# Patient Record
Sex: FEMALE | Race: WHITE | NOT HISPANIC OR LATINO | Employment: FULL TIME | URBAN - METROPOLITAN AREA
[De-identification: names, ages, dates, MRNs, and addresses within clinical notes are randomized per-mention and may not be internally consistent; named-entity substitution may affect disease eponyms.]

---

## 2018-02-23 ENCOUNTER — HOSPITAL ENCOUNTER (OUTPATIENT)
Dept: RADIOLOGY | Facility: HOSPITAL | Age: 63
Discharge: HOME | End: 2018-02-23
Payer: COMMERCIAL

## 2018-02-23 ENCOUNTER — OFFICE VISIT (OUTPATIENT)
Dept: FAMILY MEDICINE CLINIC | Facility: CLINIC | Age: 63
End: 2018-02-23
Payer: COMMERCIAL

## 2018-02-23 VITALS
RESPIRATION RATE: 16 BRPM | HEART RATE: 80 BPM | HEIGHT: 62 IN | SYSTOLIC BLOOD PRESSURE: 132 MMHG | DIASTOLIC BLOOD PRESSURE: 84 MMHG | BODY MASS INDEX: 25.14 KG/M2 | WEIGHT: 136.6 LBS
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
## 2018-02-23 DIAGNOSIS — M54.2 NECK PAIN: ICD-10-CM

## 2018-02-23 DIAGNOSIS — R53.83 OTHER FATIGUE: ICD-10-CM

## 2018-02-23 DIAGNOSIS — Z12.31 ENCOUNTER FOR SCREENING MAMMOGRAM FOR BREAST CANCER: ICD-10-CM

## 2018-02-23 PROCEDURE — 72040 X-RAY EXAM NECK SPINE 2-3 VW: CPT

## 2018-02-23 PROCEDURE — 99203 OFFICE O/P NEW LOW 30 MIN: CPT | Performed by: FAMILY MEDICINE

## 2018-02-23 RX ORDER — ACETAMINOPHEN 500 MG
1000 TABLET ORAL EVERY 6 HOURS PRN
Qty: 30 TABLET | Refills: 0 | COMMUNITY
Start: 2018-02-23

## 2018-02-23 RX ORDER — DIPHENOXYLATE HYDROCHLORIDE AND ATROPINE SULFATE 2.5; .025 MG/1; MG/1
1 TABLET ORAL DAILY
COMMUNITY

## 2018-02-23 RX ORDER — LORATADINE 10 MG/1
10 TABLET ORAL DAILY
COMMUNITY

## 2018-02-23 RX ORDER — METHOCARBAMOL 500 MG/1
500 TABLET, FILM COATED ORAL 3 TIMES DAILY PRN
Qty: 20 TABLET | Refills: 0 | Status: SHIPPED | OUTPATIENT
Start: 2018-02-23 | End: 2018-11-20 | Stop reason: ALTCHOICE
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
## 2018-03-22 ENCOUNTER — TELEPHONE (OUTPATIENT)
Dept: FAMILY MEDICINE CLINIC | Facility: CLINIC | Age: 63
End: 2018-03-22

## 2018-03-22 NOTE — TELEPHONE ENCOUNTER
Pt called in regards to her upcomin OV on 4/2  She states at her last OV you had asked her to bring the  cholesterol bw she had done at work, but pt states it is only a non-fasting LDL   She wants to know if we can just order a lipid panel to have drawn with the rest of her bw?  [phone]

## 2018-03-23 DIAGNOSIS — E78.5 HYPERLIPIDEMIA, UNSPECIFIED HYPERLIPIDEMIA TYPE: Primary | ICD-10-CM

## 2018-03-25 LAB
ALBUMIN SERPL-MCNC: 4.3 G/DL (ref 3.6–5.1)
ALBUMIN/GLOB SERPL: 1.6 (CALC) (ref 1–2.5)
ALP SERPL-CCNC: 71 U/L (ref 33–130)
ALT SERPL-CCNC: 16 U/L (ref 6–29)
APPEARANCE UR: CLEAR
AST SERPL-CCNC: 20 U/L (ref 10–35)
BACTERIA UR CULT: NORMAL
BACTERIA UR QL AUTO: NORMAL /HPF
BASOPHILS # BLD AUTO: 59 CELLS/UL (ref 0–200)
BASOPHILS NFR BLD AUTO: 0.8 %
BILIRUB SERPL-MCNC: 0.4 MG/DL (ref 0.2–1.2)
BILIRUB UR QL STRIP: NEGATIVE
BUN SERPL-MCNC: 14 MG/DL (ref 7–25)
BUN/CREAT SERPL: NORMAL (CALC) (ref 6–22)
CALCIUM SERPL-MCNC: 9.3 MG/DL (ref 8.6–10.4)
CHLORIDE SERPL-SCNC: 104 MMOL/L (ref 98–110)
CHOLEST SERPL-MCNC: 245 MG/DL
CHOLEST/HDLC SERPL: 2.6 (CALC)
CO2 SERPL-SCNC: 28 MMOL/L (ref 20–31)
COLOR UR: YELLOW
CREAT SERPL-MCNC: 0.78 MG/DL (ref 0.5–0.99)
EOSINOPHIL # BLD AUTO: 170 CELLS/UL (ref 15–500)
EOSINOPHIL NFR BLD AUTO: 2.3 %
ERYTHROCYTE [DISTWIDTH] IN BLOOD BY AUTOMATED COUNT: 13.1 % (ref 11–15)
GLOBULIN SER CALC-MCNC: 2.7 G/DL (CALC) (ref 1.9–3.7)
GLUCOSE SERPL-MCNC: 77 MG/DL (ref 65–99)
GLUCOSE UR QL STRIP: NEGATIVE
HCT VFR BLD AUTO: 37.1 % (ref 35–45)
HDLC SERPL-MCNC: 94 MG/DL
HGB BLD-MCNC: 12.4 G/DL (ref 11.7–15.5)
HGB UR QL STRIP: NEGATIVE
HYALINE CASTS #/AREA URNS LPF: NORMAL /LPF
KETONES UR QL STRIP: NEGATIVE
LDLC SERPL CALC-MCNC: 135 MG/DL (CALC)
LEUKOCYTE ESTERASE UR QL STRIP: NEGATIVE
LYMPHOCYTES # BLD AUTO: 1650 CELLS/UL (ref 850–3900)
LYMPHOCYTES NFR BLD AUTO: 22.3 %
MCH RBC QN AUTO: 30.7 PG (ref 27–33)
MCHC RBC AUTO-ENTMCNC: 33.4 G/DL (ref 32–36)
MCV RBC AUTO: 91.8 FL (ref 80–100)
MONOCYTES # BLD AUTO: 777 CELLS/UL (ref 200–950)
MONOCYTES NFR BLD AUTO: 10.5 %
NEUTROPHILS # BLD AUTO: 4743 CELLS/UL (ref 1500–7800)
NEUTROPHILS NFR BLD AUTO: 64.1 %
NITRITE UR QL STRIP: NEGATIVE
NONHDLC SERPL-MCNC: 151 MG/DL (CALC)
PH UR STRIP: 6.5 [PH] (ref 5–8)
PLATELET # BLD AUTO: 354 THOUSAND/UL (ref 140–400)
PMV BLD REES-ECKER: 10.3 FL (ref 7.5–12.5)
POTASSIUM SERPL-SCNC: 4.1 MMOL/L (ref 3.5–5.3)
PROT SERPL-MCNC: 7 G/DL (ref 6.1–8.1)
PROT UR QL STRIP: NEGATIVE
RBC # BLD AUTO: 4.04 MILLION/UL (ref 3.8–5.1)
RBC #/AREA URNS HPF: NORMAL /HPF
SL AMB EGFR AFRICAN AMERICAN: 94 ML/MIN/1.73M2
SL AMB EGFR NON AFRICAN AMERICAN: 81 ML/MIN/1.73M2
SODIUM SERPL-SCNC: 139 MMOL/L (ref 135–146)
SP GR UR STRIP: 1.01 (ref 1–1.03)
SQUAMOUS #/AREA URNS HPF: NORMAL /HPF
TRIGL SERPL-MCNC: 67 MG/DL
TSH SERPL-ACNC: 2.3 MIU/L (ref 0.4–4.5)
WBC # BLD AUTO: 7.4 THOUSAND/UL (ref 3.8–10.8)
WBC #/AREA URNS HPF: NORMAL /HPF

## 2018-03-27 ENCOUNTER — HOSPITAL ENCOUNTER (OUTPATIENT)
Dept: MAMMOGRAPHY | Facility: CLINIC | Age: 63
Discharge: HOME | End: 2018-03-27
Payer: COMMERCIAL

## 2018-03-27 DIAGNOSIS — Z12.31 ENCOUNTER FOR SCREENING MAMMOGRAM FOR BREAST CANCER: ICD-10-CM

## 2018-03-27 PROCEDURE — 77067 SCR MAMMO BI INCL CAD: CPT

## 2018-04-02 ENCOUNTER — ANNUAL EXAM (OUTPATIENT)
Dept: FAMILY MEDICINE CLINIC | Facility: CLINIC | Age: 63
End: 2018-04-02
Payer: COMMERCIAL

## 2018-04-02 VITALS
RESPIRATION RATE: 14 BRPM | HEIGHT: 62 IN | BODY MASS INDEX: 25.14 KG/M2 | DIASTOLIC BLOOD PRESSURE: 76 MMHG | WEIGHT: 136.6 LBS | SYSTOLIC BLOOD PRESSURE: 122 MMHG | HEART RATE: 74 BPM

## 2018-04-02 DIAGNOSIS — Z01.419 ENCOUNTER FOR GYNECOLOGICAL EXAMINATION: Primary | ICD-10-CM

## 2018-04-02 DIAGNOSIS — Z12.11 SCREENING FOR MALIGNANT NEOPLASM OF COLON: ICD-10-CM

## 2018-04-02 DIAGNOSIS — H10.13 ALLERGIC CONJUNCTIVITIS OF BOTH EYES: ICD-10-CM

## 2018-04-02 DIAGNOSIS — R92.8 ABNORMAL FINDING ON MAMMOGRAPHY: ICD-10-CM

## 2018-04-02 PROCEDURE — 99396 PREV VISIT EST AGE 40-64: CPT | Performed by: FAMILY MEDICINE

## 2018-04-02 RX ORDER — OLOPATADINE HYDROCHLORIDE 1 MG/ML
1 SOLUTION/ DROPS OPHTHALMIC 2 TIMES DAILY
Qty: 5 ML | Refills: 0 | Status: SHIPPED | OUTPATIENT
Start: 2018-04-02 | End: 2018-11-20 | Stop reason: ALTCHOICE

## 2018-04-02 RX ORDER — FLUTICASONE PROPIONATE 50 MCG
1 SPRAY, SUSPENSION (ML) NASAL DAILY
COMMUNITY
End: 2018-11-20 | Stop reason: ALTCHOICE

## 2018-04-03 LAB
CLINICAL INFO: NORMAL
DATE PREVIOUS BX: NORMAL
LMP START DATE: NORMAL
SL AMB PREV. PAP:: NORMAL
SPECIMEN SOURCE CVX/VAG CYTO: NORMAL

## 2018-04-03 NOTE — PROGRESS NOTES
Assessment/Plan:  1  Encounter for gynecological examination  - Thinprep Pap Rfx HR HPV    2  Screening for malignant neoplasm of colon  - Ambulatory referral to Gastroenterology    3  Allergic conjunctivitis of both eyes  - olopatadine (PATANOL) 0 1 % ophthalmic solution; Administer 1 drop to both eyes 2 (two) times a day  Dispense: 5 mL; Refill: 0    4  Abnormal finding on mammography  - left breast US ordered         Possible side effects of new medications were reviewed with the patient today  The treatment plan was reviewed with the patient  The patient understands and agrees with the treatment plan      Subjective:   Chief Complaint   Patient presents with    Gynecologic Exam      Patient ID: Brennan Pimentel is a 58 y o  female presents today for annual Gyn exam, she is c/o itchy watery eyes for the past 2 weeks, no eye pain or d/c, no eye redness, no photophobia, no fever or chills, no URI symptoms  Patient offers no other complaints  Gynecologic Exam   The patient's pertinent negatives include no genital itching, genital lesions, genital rash, pelvic pain, vaginal bleeding or vaginal discharge  Pertinent negatives include no abdominal pain, chills, constipation, diarrhea, dysuria, fever, flank pain, frequency, headaches, hematuria, nausea, painful intercourse, rash, sore throat, urgency or vomiting  She is postmenopausal        History reviewed  No pertinent past medical history  Past Surgical History:   Procedure Laterality Date     SECTION      two    DENTAL SURGERY       Family History   Problem Relation Age of Onset    Hypertension Mother     Diabetes Mother     Prostate cancer Father     Substance Abuse Brother     Pancreatic cancer Paternal Grandmother     Mental illness Neg Hx      Social History     Social History    Marital status: /Civil Union     Spouse name: N/A    Number of children: N/A    Years of education: N/A     Occupational History    Not on file  Social History Main Topics    Smoking status: Former Smoker    Smokeless tobacco: Never Used    Alcohol use Yes    Drug use: No    Sexual activity: Not on file     Other Topics Concern    Not on file     Social History Narrative    No narrative on file       Current Outpatient Prescriptions:     acetaminophen (TYLENOL) 500 mg tablet, Take 2 tablets (1,000 mg total) by mouth every 6 (six) hours as needed for mild pain, Disp: 30 tablet, Rfl: 0    fluticasone (FLONASE) 50 mcg/act nasal spray, 1 spray into each nostril daily, Disp: , Rfl:     loratadine (CLARITIN) 10 mg tablet, Take 10 mg by mouth daily, Disp: , Rfl:     methocarbamol (ROBAXIN) 500 mg tablet, Take 1 tablet (500 mg total) by mouth 3 (three) times a day as needed for muscle spasms, Disp: 20 tablet, Rfl: 0    multivitamin (THERAGRAN) TABS, Take 1 tablet by mouth daily, Disp: , Rfl:     olopatadine (PATANOL) 0 1 % ophthalmic solution, Administer 1 drop to both eyes 2 (two) times a day, Disp: 5 mL, Rfl: 0    Review of Systems   Constitutional: Negative for appetite change, chills, fatigue and fever  HENT: Negative for congestion, ear pain, postnasal drip, rhinorrhea and sore throat  Eyes: Positive for itching  Negative for photophobia, pain, discharge, redness and visual disturbance  Respiratory: Negative for cough, shortness of breath and wheezing  Cardiovascular: Negative for chest pain, palpitations and leg swelling  Gastrointestinal: Negative for abdominal pain, blood in stool, constipation, diarrhea, nausea and vomiting  Endocrine: Negative for cold intolerance, heat intolerance, polydipsia, polyphagia and polyuria  Genitourinary: Negative for dysuria, flank pain, frequency, hematuria, pelvic pain, urgency and vaginal discharge  Musculoskeletal: Negative for arthralgias, joint swelling and myalgias  Skin: Negative for rash  Neurological: Negative for dizziness, syncope, weakness, numbness and headaches  Psychiatric/Behavioral: Negative for dysphoric mood and sleep disturbance  The patient is not nervous/anxious  Objective:    Vitals:    04/02/18 1115   BP: 122/76   BP Location: Left arm   Patient Position: Sitting   Cuff Size: Standard   Pulse: 74   Resp: 14   Weight: 62 kg (136 lb 9 6 oz)   Height: 5' 2 25" (1 581 m)        Physical Exam   Constitutional: She is oriented to person, place, and time  She appears well-developed and well-nourished  HENT:   Head: Normocephalic and atraumatic  Right Ear: External ear normal    Left Ear: External ear normal    Nose: Nose normal    Mouth/Throat: Oropharynx is clear and moist    Eyes: Conjunctivae and EOM are normal  Pupils are equal, round, and reactive to light  Right eye exhibits no discharge  Left eye exhibits no discharge  No scleral icterus  Neck: Neck supple  No thyromegaly present  Cardiovascular: Normal rate, regular rhythm and normal heart sounds  No murmur heard  Pulmonary/Chest: Effort normal and breath sounds normal    Abdominal: Soft  Bowel sounds are normal  She exhibits no distension and no mass  There is no tenderness  Genitourinary: Rectum normal and vagina normal  Rectal exam shows no mass, anal tone normal and guaiac negative stool  No breast swelling, tenderness, discharge or bleeding  There is no rash or lesion on the right labia  There is no rash or lesion on the left labia  Uterus is not enlarged and not tender  Cervix exhibits no motion tenderness and no discharge  Right adnexum displays no mass, no tenderness and no fullness  Left adnexum displays no mass, no tenderness and no fullness  Musculoskeletal: She exhibits no edema  Lymphadenopathy:     She has no cervical adenopathy  Right: No inguinal adenopathy present  Left: No inguinal adenopathy present  Neurological: She is alert and oriented to person, place, and time  No cranial nerve deficit  Skin: Skin is warm and dry  No rash noted  Psychiatric: She has a normal mood and affect  Thought content normal        Orders Only on 03/24/2018   Component Date Value Ref Range Status    Total Cholesterol 03/24/2018 245* <200 mg/dL Final    SL AMB HDL CHOLESTEROL 03/24/2018 94  >50 mg/dL Final    Triglycerides 03/24/2018 67  <150 mg/dL Final    SL AMB LDL-CHOLESTEROL 03/24/2018 135* mg/dL (calc) Final    Comment: Reference range: <100     Desirable range <100 mg/dL for patients with CHD or  diabetes and <70 mg/dL for diabetic patients with  known heart disease  LDL-C is now calculated using the Naman-Dunn   calculation, which is a validated novel method providing   better accuracy than the Friedewald equation in the   estimation of LDL-C  Ida Malone  [facility]26;787(92): 9465-5421   (http://[employer]/faq/QUD141)      SL AMB CHOL/HDLC RATIO 03/24/2018 2 6  <5 0 (calc) Final    SL AMB NON HDL CHOLESTEROL 03/24/2018 151* <130 mg/dL (calc) Final    Comment: For patients with diabetes plus 1 major ASCVD risk   factor, treating to a non-HDL-C goal of <100 mg/dL   (LDL-C of <70 mg/dL) is considered a therapeutic   option   SL AMB GLUCOSE 03/24/2018 77  65 - 99 mg/dL Final    Comment:               Fasting reference interval         BUN 03/24/2018 14  7 - 25 mg/dL Final    Creatinine, Serum 03/24/2018 0 78  0 50 - 0 99 mg/dL Final    Comment: For patients >52years of age, the reference limit  for Creatinine is approximately 13% higher for people  identified as -American           eGFR Non  03/24/2018 81  > OR = 60 mL/min/1 73m2 Final    SL AMB EGFR  03/24/2018 94  > OR = 60 mL/min/1 73m2 Final    SL AMB BUN/CREATININE RATIO 87/71/7740 NOT APPLICABLE  6 - 22 (calc) Final    SL AMB SODIUM 03/24/2018 139  135 - 146 mmol/L Final    SL AMB POTASSIUM 03/24/2018 4 1  3 5 - 5 3 mmol/L Final    SL AMB CHLORIDE 03/24/2018 104  98 - 110 mmol/L Final    SL AMB CARBON DIOXIDE 03/24/2018 28  20 - 31 mmol/L Final    SL AMB CALCIUM 03/24/2018 9 3  8 6 - 10 4 mg/dL Final    SL AMB PROTEIN, TOTAL 03/24/2018 7 0  6 1 - 8 1 g/dL Final    Serum Albumin 03/24/2018 4 3  3 6 - 5 1 g/dL Final    SL AMB GLOBULIN 03/24/2018 2 7  1 9 - 3 7 g/dL (calc) Final    SL AMB ALBUMIN/GLOBULIN RATIO 03/24/2018 1 6  1 0 - 2 5 (calc) Final    SL AMB BILIRUBIN, TOTAL 03/24/2018 0 4  0 2 - 1 2 mg/dL Final    SL AMB ALKALINE PHOSPHATASE 03/24/2018 71  33 - 130 U/L Final    SL AMB AST 03/24/2018 20  10 - 35 U/L Final    SL AMB ALT 03/24/2018 16  6 - 29 U/L Final    SL AMB COLOR, URINE 03/24/2018 YELLOW  YELLOW Final    Urine Appearance 03/24/2018 CLEAR  CLEAR Final    Specific Gravity 03/24/2018 1 012  1 001 - 1 035 Final    Ph 03/24/2018 6 5  5 0 - 8 0 Final    SL AMB GLUCOSE, URINE, QUAL  03/24/2018 NEGATIVE  NEGATIVE Final    SL AMB BILIRUBIN, URINE 03/24/2018 NEGATIVE  NEGATIVE Final    SL AMB KETONE, URINE, QUAL  03/24/2018 NEGATIVE  NEGATIVE Final    SL AMB BLOOD, URINE 03/24/2018 NEGATIVE  NEGATIVE Final    SL AMB PROTEIN, URINE, QUAL  03/24/2018 NEGATIVE  NEGATIVE Final    SL AMB NITRITES URINE, QUAL  03/24/2018 NEGATIVE  NEGATIVE Final    SL AMB LEUKOCYTE ESTERASE URINE 03/24/2018 NEGATIVE  NEGATIVE Final    SL AMB WBC, URINE 03/24/2018 NONE SEEN  < OR = 5 /HPF Final    SL AMB RBC, URINE 03/24/2018 0-2  < OR = 2 /HPF Final    Squamous Epithelial Cells 03/24/2018 NONE SEEN  < OR = 5 /HPF Final    Bacteria, UA 03/24/2018 NONE SEEN  NONE SEEN /HPF Final    Hyaline Casts 03/24/2018 NONE SEEN  NONE SEEN /LPF Final    SL AMB LAB WHITE BLOOD CELL COUNT 03/24/2018 7 4  3 8 - 10 8 Thousand/uL Final    SL AMB LAB RED BLOOD CELLS 03/24/2018 4 04  3 80 - 5 10 Million/uL Final    Hemoglobin 03/24/2018 12 4  11 7 - 15 5 g/dL Final    Hematocrit 03/24/2018 37 1  35 0 - 45 0 % Final    MCV 03/24/2018 91 8  80 0 - 100 0 fL Final    MCH 03/24/2018 30 7  27 0 - 33 0 pg Final    MCHC 03/24/2018 33 4  32 0 - 36 0 g/dL Final    RDW 03/24/2018 13 1  11 0 - 15 0 % Final    Platelet Count 44/05/0245 354  140 - 400 Thousand/uL Final    SL AMB MPV 03/24/2018 10 3  7 5 - 12 5 fL Final    Neutrophils (Absolute) 03/24/2018 4743  1,500 - 7,800 cells/uL Final    Lymphocytes (Absolute) 03/24/2018 1650  850 - 3,900 cells/uL Final    Monocytes (Absolute) 03/24/2018 777  200 - 950 cells/uL Final    Eosinophils (Absolute) 03/24/2018 170  15 - 500 cells/uL Final    Basophils (Absolute) 03/24/2018 59  0 - 200 cells/uL Final    Neutrophils 03/24/2018 64 1  % Final    Lymphocytes 03/24/2018 22 3  % Final    Monocytes 03/24/2018 10 5  % Final    Eosinophils 03/24/2018 2 3  % Final    Basophils 03/24/2018 0 8  % Final    Urine Culture, Comprehensive 03/24/2018 NO CULTURE INDICATED   Final    SL AMB TSH W/ REFLEX TO FREE T4 03/24/2018 2 30  0 40 - 4 50 mIU/L Final

## 2018-04-09 NOTE — PROGRESS NOTES
Dr Dave Graf notified that I have been unable to reach this patient regarding the need for additional imaging from her screening mammogram dated 3/27/18  I have left 3 voice messages and a results letter was mailed on 3/29/18

## 2018-04-18 ENCOUNTER — HOSPITAL ENCOUNTER (OUTPATIENT)
Dept: ULTRASOUND IMAGING | Facility: CLINIC | Age: 63
Discharge: HOME | End: 2018-04-18
Payer: COMMERCIAL

## 2018-04-18 DIAGNOSIS — R92.8 ABNORMAL SCREENING MAMMOGRAM: ICD-10-CM

## 2018-04-18 PROCEDURE — 76642 ULTRASOUND BREAST LIMITED: CPT

## 2018-05-05 ENCOUNTER — OFFICE VISIT (OUTPATIENT)
Dept: FAMILY MEDICINE CLINIC | Facility: CLINIC | Age: 63
End: 2018-05-05
Payer: COMMERCIAL

## 2018-05-05 VITALS
SYSTOLIC BLOOD PRESSURE: 122 MMHG | HEIGHT: 62 IN | DIASTOLIC BLOOD PRESSURE: 68 MMHG | WEIGHT: 135.4 LBS | HEART RATE: 68 BPM | BODY MASS INDEX: 24.92 KG/M2 | RESPIRATION RATE: 14 BRPM

## 2018-05-05 DIAGNOSIS — H57.89 EYE SWELLING, BILATERAL: Primary | ICD-10-CM

## 2018-05-05 PROCEDURE — 99213 OFFICE O/P EST LOW 20 MIN: CPT | Performed by: PHYSICIAN ASSISTANT

## 2018-05-05 RX ORDER — CETIRIZINE HYDROCHLORIDE 10 MG/1
10 TABLET ORAL DAILY
Qty: 30 TABLET | Refills: 0 | Status: SHIPPED | OUTPATIENT
Start: 2018-05-05 | End: 2018-11-20 | Stop reason: ALTCHOICE

## 2018-05-05 RX ORDER — PREDNISONE 20 MG/1
20 TABLET ORAL 2 TIMES DAILY WITH MEALS
Qty: 10 TABLET | Refills: 0 | Status: SHIPPED | OUTPATIENT
Start: 2018-05-05 | End: 2018-11-20 | Stop reason: ALTCHOICE

## 2018-05-05 NOTE — PROGRESS NOTES
Assessment/Plan:    1  Eye swelling, bilateral    - allergic in nature, stop Claritin start Zyrtec 10 mg once nightly for now  Start prednisone 20 mg 1 tab twice daily for 5 days with food  No makeup  Call if no better Monday  - predniSONE 20 mg tablet; Take 1 tablet (20 mg total) by mouth 2 (two) times a day with meals  Dispense: 10 tablet; Refill: 0  - cetirizine (ZyrTEC) 10 mg tablet; Take 1 tablet (10 mg total) by mouth daily  Dispense: 30 tablet; Refill: 0    f/u as scheduled  f/u as needed    Subjective:   Chief Complaint   Patient presents with   [address] [place] [address]      Patient ID: Alea Russ is a 58 y o  female  Patient with increase in eye watering and some upper eye lid redness, saw Dr Suni Lin and was given patanol eye drops and made the upper eye lid swelling and redness worse  Watering improved  Stopped eye drops  Upper eye lid, itching and burning  Taking Claritin and Flonase  Denies new facial lotions, makeup  No change in vision, no eye pain, no discharge, no eye trauma  Called eye doctor and he advised she come here  Appt for routine eye check   The following portions of the patient's history were reviewed and updated as appropriate: allergies, current medications, past family history, past medical history, past social history, past surgical history and problem list     No past medical history on file  Past Surgical History:   Procedure Laterality Date     SECTION      two    DENTAL SURGERY       Family History   Problem Relation Age of Onset    Hypertension Mother     Diabetes Mother     Prostate cancer Father     Substance Abuse Brother     Pancreatic cancer Paternal Grandmother     Mental illness Neg Hx      Social History     Social History    Marital status: /Civil Union     Spouse name: N/A    Number of children: N/A    Years of education: N/A     Occupational History    Not on file       Social History Main Topics    Smoking status: Former Smoker    Smokeless tobacco: Never Used    Alcohol use Yes    Drug use: No    Sexual activity: Not on file     Other Topics Concern    Not on file     Social History Narrative    No narrative on file       Current Outpatient Prescriptions:     acetaminophen (TYLENOL) 500 mg tablet, Take 2 tablets (1,000 mg total) by mouth every 6 (six) hours as needed for mild pain, Disp: 30 tablet, Rfl: 0    fluticasone (FLONASE) 50 mcg/act nasal spray, 1 spray into each nostril daily, Disp: , Rfl:     loratadine (CLARITIN) 10 mg tablet, Take 10 mg by mouth daily, Disp: , Rfl:     methocarbamol (ROBAXIN) 500 mg tablet, Take 1 tablet (500 mg total) by mouth 3 (three) times a day as needed for muscle spasms, Disp: 20 tablet, Rfl: 0    multivitamin (THERAGRAN) TABS, Take 1 tablet by mouth daily, Disp: , Rfl:     olopatadine (PATANOL) 0 1 % ophthalmic solution, Administer 1 drop to both eyes 2 (two) times a day, Disp: 5 mL, Rfl: 0    Review of Systems          Objective:    Vitals:    05/05/18 1157   BP: 122/68   Pulse: 68   Resp: 14   Weight: 61 4 kg (135 lb 6 4 oz)   Height: 5' 2" (1 575 m)        Physical Exam   Constitutional: She is oriented to person, place, and time  She appears well-developed and well-nourished  HENT:   Head: Normocephalic and atraumatic  Right Ear: Tympanic membrane, external ear and ear canal normal    Left Ear: Tympanic membrane, external ear and ear canal normal    Nose: Nose normal    Mouth/Throat: Oropharynx is clear and moist    Eyes: Conjunctivae and EOM are normal  Pupils are equal, round, and reactive to light    b/l upper eye lids with soft tissue swelling, no erythema, warmth, tenderness   Neck: Neck supple  Cardiovascular: Normal rate, regular rhythm and normal heart sounds  Pulmonary/Chest: Effort normal and breath sounds normal    Lymphadenopathy:     She has no cervical adenopathy  Neurological: She is alert and oriented to person, place, and time     Psychiatric: She has a normal mood and affect   Judgment normal

## 2018-11-20 ENCOUNTER — OFFICE VISIT (OUTPATIENT)
Dept: FAMILY MEDICINE CLINIC | Facility: CLINIC | Age: 63
End: 2018-11-20
Payer: COMMERCIAL

## 2018-11-20 ENCOUNTER — TELEPHONE (OUTPATIENT)
Dept: FAMILY MEDICINE CLINIC | Facility: CLINIC | Age: 63
End: 2018-11-20

## 2018-11-20 VITALS
HEART RATE: 68 BPM | BODY MASS INDEX: 24.48 KG/M2 | WEIGHT: 133 LBS | HEIGHT: 62 IN | RESPIRATION RATE: 12 BRPM | DIASTOLIC BLOOD PRESSURE: 70 MMHG | SYSTOLIC BLOOD PRESSURE: 104 MMHG

## 2018-11-20 DIAGNOSIS — Z12.11 SCREENING FOR MALIGNANT NEOPLASM OF COLON: ICD-10-CM

## 2018-11-20 DIAGNOSIS — L21.9 SEBORRHEIC DERMATITIS: ICD-10-CM

## 2018-11-20 DIAGNOSIS — Z12.39 SCREENING FOR MALIGNANT NEOPLASM OF BREAST: Primary | ICD-10-CM

## 2018-11-20 PROCEDURE — 99213 OFFICE O/P EST LOW 20 MIN: CPT | Performed by: NURSE PRACTITIONER

## 2018-11-20 RX ORDER — PREDNISONE 10 MG/1
TABLET ORAL
Qty: 20 TABLET | Refills: 0 | Status: SHIPPED | OUTPATIENT
Start: 2018-11-20

## 2018-11-20 NOTE — PATIENT INSTRUCTIONS
1  Seborrheic dermatitis  Please take the pred taper as well as using the cream am and pm    Please use the Basis soap and avoid using the oil that your are using    - nystatin-triamcinolone (MYCOLOG-II) cream; Apply topically 2 (two) times a day  Dispense: 60 g; Refill: 2  - predniSONE 10 mg tablet; Take 4,4,3,3,2,2,1,1  Dispense: 20 tablet; Refill: 0    Please call me if you are not getting better

## 2018-11-20 NOTE — PROGRESS NOTES
Assessment/Plan:      1  Seborrheic dermatitis  Please take the pred taper as well as using the cream am and pm    Please use the Basis soap and avoid using the oil that your are using    - nystatin-triamcinolone (MYCOLOG-II) cream; Apply topically 2 (two) times a day  Dispense: 60 g; Refill: 2  - predniSONE 10 mg tablet; Take 4,4,3,3,2,2,1,1  Dispense: 20 tablet; Refill: 0    Please call me if you are not getting better  Subjective:      Patient ID: Gerber Hutchinson is a 61 y o  female  Here today with complaint of skin rash and irritated eyes  This started in March of this year with lots watery eyes  The tears subsequently irritated her skin and she developed a rash   Was at first treated with allergy eye drops and claritin but as the tears irritated the skin, was treated with prednisone  The pred taper cleared her skin temporarily but when it was complete the rash returned  Saw an eye doctor who prescribed prenisolone eye drops but this has not helped her skin rash   Dara Soulier At first was around her eyes         OBJECTIVE  History reviewed  No pertinent past medical history  @[facility]    Wt Readings from Last 3 Encounters:   11/20/18 60 3 kg (133 lb)   05/05/18 61 4 kg (135 lb 6 4 oz)   04/02/18 62 kg (136 lb 9 6 oz)     BP Readings from Last 3 Encounters:   11/20/18 104/70   05/05/18 122/68   04/02/18 122/76     Pulse Readings from Last 3 Encounters:   11/20/18 68   05/05/18 68   04/02/18 74     BMI Readings from Last 3 Encounters:   11/20/18 24 33 kg/m²   05/05/18 24 76 kg/m²   04/02/18 24 78 kg/m²        Physical Exam   Constitutional: She appears well-developed and well-nourished  Eyes: Pupils are equal, round, and reactive to light  Skin:   Erythematous macular rash with dry  flaking around the R eye in the upper and lower lid as well as the cheek  simislar rash on the L eye but to a lesser extent  This is also present in the nasolabial folds b/l and on the R cheek  Remainder for the skin is clear

## 2018-11-26 ENCOUNTER — TELEPHONE (OUTPATIENT)
Dept: FAMILY MEDICINE CLINIC | Facility: CLINIC | Age: 63
End: 2018-11-26

## 2018-11-26 DIAGNOSIS — B35.4 TINEA CORPORIS: Primary | ICD-10-CM

## 2018-11-26 RX ORDER — CLOTRIMAZOLE 1 %
CREAM (GRAM) TOPICAL 2 TIMES DAILY
Qty: 60 G | Refills: 2 | Status: SHIPPED | OUTPATIENT
Start: 2018-11-26

## 2018-11-26 NOTE — TELEPHONE ENCOUNTER
Patient said the Nystatin was denied by her ins company  However they gave her two other alternatives that the ins company will pay for      LOTRISONE  CLOTRIMAZOLE      [facility]      Pt # -[phone]    I leave at 3 today, please forward to someone else if after that time

## 2019-07-04 ENCOUNTER — TELEPHONE (OUTPATIENT)
Dept: OTHER | Facility: OTHER | Age: 64
End: 2019-07-04

## 2019-07-04 ENCOUNTER — OFFICE VISIT (OUTPATIENT)
Dept: URGENT CARE | Facility: CLINIC | Age: 64
End: 2019-07-04
Payer: COMMERCIAL

## 2019-07-04 VITALS
SYSTOLIC BLOOD PRESSURE: 127 MMHG | HEART RATE: 80 BPM | WEIGHT: 136 LBS | RESPIRATION RATE: 18 BRPM | OXYGEN SATURATION: 96 % | BODY MASS INDEX: 25.03 KG/M2 | DIASTOLIC BLOOD PRESSURE: 60 MMHG | HEIGHT: 62 IN | TEMPERATURE: 97.4 F

## 2019-07-04 DIAGNOSIS — R21 RASH: Primary | ICD-10-CM

## 2019-07-04 PROCEDURE — 99283 EMERGENCY DEPT VISIT LOW MDM: CPT | Performed by: PHYSICIAN ASSISTANT

## 2019-07-04 PROCEDURE — G0382 LEV 3 HOSP TYPE B ED VISIT: HCPCS | Performed by: PHYSICIAN ASSISTANT

## 2019-07-04 RX ORDER — PREDNISONE 10 MG/1
TABLET ORAL
Qty: 21 TABLET | Refills: 0 | Status: SHIPPED | OUTPATIENT
Start: 2019-07-04

## 2019-07-04 NOTE — PATIENT INSTRUCTIONS
Take prednisone as directed  Continue with OTC symptom relief  Follow up with PCP if no improvement in 3-5 days  Go to ER if chest pain or difficulty breathing

## 2019-07-04 NOTE — TELEPHONE ENCOUNTER
Domenic Ross 1955  CONFIDENTIALTY NOTICE: This fax transmission is intended only for the addressee  It contains information that is legally privileged,  confidential or otherwise protected from use or disclosure  If you are not the intended recipient, you are strictly prohibited from reviewing,  disclosing, copying using or disseminating any of this information or taking any action in reliance on or regarding this information  If you have  received this fax in error, please notify us immediately by telephone so that we can arrange for its return to us  Page: 1  2  Call Id: [id number]  Health Call  Standard Call Report  Health Call  Patient Name: Domenic Ross  Gender: Male  : 1955  Age: 61 Y 8 M 29 D  Return Phone  Number: [phone] (Current)  Address: [address] [place] [address]/[facility]/Zip: [address] [facility] [address]  Practice Name: [address]  Practice Charged:  Physician:  [address] [place] [address] Name:  Relationship To  Patient: Self  Return Phone Number: [phone] (Current)  Presenting Problem: " I have a very bad rash on my face  and it is very itchy "  Service Type: Triage  Charged Service 1: N/A  Pharmacy Name and  Number:  Nurse Assessment  Nurse: Libby Monte Date/Time: 2019 8:16:33 AM  Type of assessment required:  ---General (Adult or Child)  Duration of Current S/S  ---Started Wednesday  Location/Radiation  ---Face, mostly chin, cheeks, and forehead  Temperature (F) and route:  ---Denies fever  Symptom Specific Meds (Dose/Time):  ---Has been using Eucerin cream to wash her face  Other S/S  ---Patient stated that this has happened before  Face starts to become red, "flares up to  bright red", dry, flakey, and then is extremely itchy    Pain Scale on scale of 1-10, 10 being the worst:  ---"No pain, just very itchy "  Symptom progression:  ---worse  Intake and Output  ---WNL  Domenic Ross 1955  CONFIDENTIALTY NOTICE: This fax transmission is intended only for the addressee  It contains information that is legally privileged,  confidential or otherwise protected from use or disclosure  If you are not the intended recipient, you are strictly prohibited from reviewing,  disclosing, copying using or disseminating any of this information or taking any action in reliance on or regarding this information  If you have  received this fax in error, please notify us immediately by telephone so that we can arrange for its return to us  Page: 2 of 2  Call Id: [id number]  Nurse Assessment  Last Exam/Treatment:  ---11/26/2019 for sick visit  Protocols  Protocol Title Nurse Date/Time  No Guideline Available - Sick Adult Call Renny Hassan 7/4/2019 8:33:27 AM  Question Caller Affirmed  Disp  Time Disposition Final User  7/4/2019 8:34:43 AM See Physician within 4 Hours (or PCP  triage)  Isaura Garcia RN, [facility]  7/4/2019 8:37:47 AM RN Triaged Yes Isaura Garcia RN, [facility] Advice Given Per Protocol  SEE PHYSICIAN WITHIN 4 HOURS (or PCP triage): * IF OFFICE WILL BE CLOSED AND NO PCP TRIAGE: You need to be  seen within the next 3 or 4 hours  A nearby Urgent Care Center is often a good source of care  Another choice is to go to the ER  Go  sooner if you become worse  CALL BACK IF: * New symptoms develop * You become worse  CARE ADVICE per nursing judgment or  reference (No Guideline Available - Sick Adult Call; Adult)  Caller Understands: Yes  Caller Disagree/Comply: Comply  PreDisposition: Unsure  Comments  User: Patti Funesr, RN Date/Time: 7/4/2019 8:37:41 AM  Patient stated that she has had this in the past  Patient leaving for [place] and would like for prednisone to be called in  Explained to patient that she would need to go to [facility]  Patient verbalized understanding and stated that she would go today  Did  advise patient to wash face with water and to keep protected from the sun

## 2019-07-04 NOTE — PROGRESS NOTES
Assessment/Plan    Rash [R21]  1  Rash  predniSONE 10 mg tablet     Take prednisone as directed  Continue with OTC symptom relief  Follow up with PCP if no improvement in 3-5 days  Go to ER if chest pain or difficulty breathing    Subjective:     Patient ID: Eva Cardenas is a 61 y o  female  Reason For Visit / Chief Complaint  Chief Complaint   Patient presents with    Rash     Yesterday pt started with a facial rash  Pt had a similar rash last November and was tx with Prednisone  Pt tried aloe and benadryl  Pt denies pain  +itching  Patient presents with complaint of red facial rash x 2 days  Pt states that she had this once before in 2018  Pt states that the rash is itchy and localized to her cheeks and chin  Pt reports being in the sun yesterday but denies getting sunburn  Pt reports taking benadryl and applying aloe vera without relief  Pt denies fever, chills, night sweats, difficulty breathing or swallowing, chest tightness, congestion, sore throat, abdominal pain, nausea, and vomiting  History reviewed  No pertinent past medical history  Past Surgical History:   Procedure Laterality Date     SECTION      two    DENTAL SURGERY         Family History   Problem Relation Age of Onset    Hypertension Mother     Diabetes Mother     Prostate cancer Father     Substance Abuse Brother     Pancreatic cancer Paternal Grandmother     Mental illness Neg Hx        Review of Systems   Constitutional: Negative for chills, fatigue and fever  HENT: Negative for congestion, postnasal drip, rhinorrhea, sinus pressure and sore throat  Eyes: Negative for pain, redness and itching  Respiratory: Negative for cough, chest tightness, shortness of breath and wheezing  Cardiovascular: Negative for chest pain and palpitations  Gastrointestinal: Negative for abdominal pain, diarrhea, nausea and vomiting  Musculoskeletal: Negative for myalgias, neck pain and neck stiffness  Skin: Positive for rash  Negative for color change and wound  Neurological: Negative for dizziness, light-headedness and headaches  All other systems reviewed and are negative  Objective:    /60 (BP Location: Right arm, Patient Position: Sitting)   Pulse 80   Temp (!) 97 4 °F (36 3 °C) (Tympanic)   Resp 18   Ht 5' 2" (1 575 m)   Wt 61 7 kg (136 lb)   LMP  (LMP Unknown)   SpO2 96%   BMI 24 87 kg/m²     Physical Exam   Constitutional: She is oriented to person, place, and time  She appears well-developed and well-nourished  No distress  HENT:   Head: Normocephalic and atraumatic  Right Ear: External ear normal    Left Ear: External ear normal    Nose: Nose normal    Mouth/Throat: Oropharynx is clear and moist  No oropharyngeal exudate  Eyes: Pupils are equal, round, and reactive to light  Conjunctivae and EOM are normal  Right eye exhibits no discharge  Left eye exhibits no discharge  Neck: Normal range of motion  Neck supple  Cardiovascular: Normal rate, regular rhythm and normal heart sounds  Pulmonary/Chest: Effort normal and breath sounds normal  No respiratory distress  She has no wheezes  Lymphadenopathy:     She has no cervical adenopathy  Neurological: She is alert and oriented to person, place, and time  No cranial nerve deficit or sensory deficit  Skin: Skin is warm and dry  Capillary refill takes less than 2 seconds  Rash noted  She is not diaphoretic  There is erythema  Erythematous macular rash across bilateral cheeks and chin; NTTP; blanches   Psychiatric: She has a normal mood and affect  Her behavior is normal  Thought content normal    Nursing note and vitals reviewed

## 2022-12-16 ENCOUNTER — TELEPHONE (OUTPATIENT)
Dept: FAMILY MEDICINE CLINIC | Facility: CLINIC | Age: 67
End: 2022-12-16

## 2022-12-16 NOTE — TELEPHONE ENCOUNTER
Left message for patient to call us back to let us know if she has found a new PCP as she hasn't been seen since Nov 2018  If she would like to continue with us she needs to call to schedule an appointment or our name will be removed as her PCP

## 2022-12-21 ENCOUNTER — TELEPHONE (OUTPATIENT)
Dept: FAMILY MEDICINE CLINIC | Facility: CLINIC | Age: 67
End: 2022-12-21

## 2022-12-28 NOTE — TELEPHONE ENCOUNTER
12/28/22 4:08 PM     The office's request has been received  If patient has not been seen within last 3 years, 3 phone calls and a certified letter (must be letter listed in workflow) are to be sent  Once that workflow is completed, please resend PCP removal request with date letter was sent  PCP will be removed 30 days after certified letter sent (if patient doesn't respond/ schedule with office)       Thank you  Remedios Morales

## 2023-02-23 ENCOUNTER — TELEPHONE (OUTPATIENT)
Dept: FAMILY MEDICINE CLINIC | Facility: CLINIC | Age: 68
End: 2023-02-23

## 2023-02-23 NOTE — TELEPHONE ENCOUNTER
LMOM for pt to call us back to see if she is still our pt  Pt has not been seen since 2018 and would need to make an appt if she is still going to be coming here

## 2023-05-22 ENCOUNTER — OFFICE VISIT (OUTPATIENT)
Dept: FAMILY MEDICINE CLINIC | Facility: CLINIC | Age: 68
End: 2023-05-22

## 2023-05-22 VITALS
DIASTOLIC BLOOD PRESSURE: 82 MMHG | HEIGHT: 62 IN | WEIGHT: 140.6 LBS | BODY MASS INDEX: 25.88 KG/M2 | HEART RATE: 85 BPM | RESPIRATION RATE: 16 BRPM | SYSTOLIC BLOOD PRESSURE: 136 MMHG

## 2023-05-22 DIAGNOSIS — R53.83 FATIGUE, UNSPECIFIED TYPE: ICD-10-CM

## 2023-05-22 DIAGNOSIS — R03.0 ELEVATED BLOOD PRESSURE READING: ICD-10-CM

## 2023-05-22 DIAGNOSIS — Z00.00 MEDICARE ANNUAL WELLNESS VISIT, INITIAL: Primary | ICD-10-CM

## 2023-05-22 DIAGNOSIS — Z78.0 POSTMENOPAUSAL: ICD-10-CM

## 2023-05-22 DIAGNOSIS — Z12.11 SCREENING FOR MALIGNANT NEOPLASM OF COLON: ICD-10-CM

## 2023-05-22 DIAGNOSIS — E55.9 VITAMIN D DEFICIENCY: ICD-10-CM

## 2023-05-22 DIAGNOSIS — Z12.31 ENCOUNTER FOR SCREENING MAMMOGRAM FOR MALIGNANT NEOPLASM OF BREAST: ICD-10-CM

## 2023-05-22 DIAGNOSIS — E78.5 HYPERLIPIDEMIA, UNSPECIFIED HYPERLIPIDEMIA TYPE: ICD-10-CM

## 2023-05-22 DIAGNOSIS — Z13.820 SCREENING FOR OSTEOPOROSIS: ICD-10-CM

## 2023-05-22 NOTE — PATIENT INSTRUCTIONS
Medicare Preventive Visit Patient Instructions  Thank you for completing your Welcome to Medicare Visit or Medicare Annual Wellness Visit today  Your next wellness visit will be due in one year (5/22/2024)  The screening/preventive services that you may require over the next 5-10 years are detailed below  Some tests may not apply to you based off risk factors and/or age  Screening tests ordered at today's visit but not completed yet may show as past due  Also, please note that scanned in results may not display below  Preventive Screenings:  Service Recommendations Previous Testing/Comments   Colorectal Cancer Screening  * Colonoscopy    * Fecal Occult Blood Test (FOBT)/Fecal Immunochemical Test (FIT)  * Fecal DNA/Cologuard Test  * Flexible Sigmoidoscopy Age: 39-70 years old   Colonoscopy: every 10 years (may be performed more frequently if at higher risk)  OR  FOBT/FIT: every 1 year  OR  Cologuard: every 3 years  OR  Sigmoidoscopy: every 5 years  Screening may be recommended earlier than age 39 if at higher risk for colorectal cancer  Also, an individualized decision between you and your healthcare provider will decide whether screening between the ages of 74-80 would be appropriate  Colonoscopy: Not on file  FOBT/FIT: Not on file  Cologuard: Not on file  Sigmoidoscopy: Not on file          Breast Cancer Screening Age: 36 years old  Frequency: every 1-2 years  Not required if history of left and right mastectomy Mammogram: 03/27/2018        Cervical Cancer Screening Between the ages of 21-29, pap smear recommended once every 3 years  Between the ages of 33-67, can perform pap smear with HPV co-testing every 5 years     Recommendations may differ for women with a history of total hysterectomy, cervical cancer, or abnormal pap smears in past  Pap Smear: Not on file    Screening Not Indicated   Hepatitis C Screening Once for adults born between [facility]  More frequently in patients at high risk for Hepatitis C Hep C Antibody: Not on file    Screening Current   Diabetes Screening 1-2 times per year if you're at risk for diabetes or have pre-diabetes Fasting glucose: No results in last 5 years (No results in last 5 years)  A1C: No results in last 5 years (No results in last 5 years)      Cholesterol Screening Once every 5 years if you don't have a lipid disorder  May order more often based on risk factors  Lipid panel: Not on file          Other Preventive Screenings Covered by Medicare:  1  Abdominal Aortic Aneurysm (AAA) Screening: covered once if your at risk  You're considered to be at risk if you have a family history of AAA  2  Lung Cancer Screening: covers low dose CT scan once per year if you meet all of the following conditions: (1) Age 50-69; (2) No signs or symptoms of lung cancer; (3) Current smoker or have quit smoking within the last 15 years; (4) You have a tobacco smoking history of at least 20 pack years (packs per day multiplied by number of years you smoked); (5) You get a written order from a healthcare provider  3  Glaucoma Screening: covered annually if you're considered high risk: (1) You have diabetes OR (2) Family history of glaucoma OR (3)  aged 48 and older OR (3)  American aged 72 and older  3  Osteoporosis Screening: covered every 2 years if you meet one of the following conditions: (1) You're estrogen deficient and at risk for osteoporosis based off medical history and other findings; (2) Have a vertebral abnormality; (3) On glucocorticoid therapy for more than 3 months; (4) Have primary hyperparathyroidism; (5) On osteoporosis medications and need to assess response to drug therapy  · Last bone density test (DXA Scan): Not on file  5  HIV Screening: covered annually if you're between the age of 12-76  Also covered annually if you are younger than 13 and older than 72 with risk factors for HIV infection   For pregnant patients, it is covered up to 3 times per pregnancy  Immunizations:  Immunization Recommendations   Influenza Vaccine Annual influenza vaccination during flu season is recommended for all persons aged >= 6 months who do not have contraindications   Pneumococcal Vaccine   * Pneumococcal conjugate vaccine = PCV13 (Prevnar 13), PCV15 (Vaxneuvance), PCV20 (Prevnar 20)  * Pneumococcal polysaccharide vaccine = PPSV23 (Pneumovax) Adults 25-60 years old: 1-3 doses may be recommended based on certain risk factors  Adults 72 years old: 1-2 doses may be recommended based off what pneumonia vaccine you previously received   Hepatitis B Vaccine 3 dose series if at intermediate or high risk (ex: diabetes, end stage renal disease, liver disease)   Tetanus (Td) Vaccine - COST NOT COVERED BY MEDICARE PART B Following completion of primary series, a booster dose should be given every 10 years to maintain immunity against tetanus  Td may also be given as tetanus wound prophylaxis  Tdap Vaccine - COST NOT COVERED BY MEDICARE PART B Recommended at least once for all adults  For pregnant patients, recommended with each pregnancy  Shingles Vaccine (Shingrix) - COST NOT COVERED BY MEDICARE PART B  2 shot series recommended in those aged 48 and above     Health Maintenance Due:      Topic Date Due   • Colorectal Cancer Screening  Never done   • Breast Cancer Screening: Mammogram  03/27/2019   • Hepatitis C Screening  06/22/2025 (Originally 1955)     Immunizations Due:      Topic Date Due   • COVID-19 Vaccine (1) Never done   • Pneumococcal Vaccine: 65+ Years (1 - PCV) Never done     Advance Directives   What are advance directives? Advance directives are legal documents that state your wishes and plans for medical care  These plans are made ahead of time in case you lose your ability to make decisions for yourself  Advance directives can apply to any medical decision, such as the treatments you want, and if you want to donate organs     What are the types of advance directives? There are many types of advance directives, and each state has rules about how to use them  You may choose a combination of any of the following:  · Living will: This is a written record of the treatment you want  You can also choose which treatments you do not want, which to limit, and which to stop at a certain time  This includes surgery, medicine, IV fluid, and tube feedings  · Durable power of  for healthcare [facility] [place]): This is a written record that states who you want to make healthcare choices for you when you are unable to make them for yourself  This person, called a proxy, is usually a family member or a friend  You may choose more than 1 proxy  · Do not resuscitate (DNR) order:  A DNR order is used in case your heart stops beating or you stop breathing  It is a request not to have certain forms of treatment, such as CPR  A DNR order may be included in other types of advance directives  · Medical directive: This covers the care that you want if you are in a coma, near death, or unable to make decisions for yourself  You can list the treatments you want for each condition  Treatment may include pain medicine, surgery, blood transfusions, dialysis, IV or tube feedings, and a ventilator (breathing machine)  · Values history: This document has questions about your views, beliefs, and how you feel and think about life  This information can help others choose the care that you would choose  Why are advance directives important? An advance directive helps you control your care  Although spoken wishes may be used, it is better to have your wishes written down  Spoken wishes can be misunderstood, or not followed  Treatments may be given even if you do not want them  An advance directive may make it easier for your family to make difficult choices about your care     Weight Management   Why it is important to manage your weight:  Being overweight increases your risk of health conditions such as heart disease, high blood pressure, type 2 diabetes, and certain types of cancer  It can also increase your risk for osteoarthritis, sleep apnea, and other respiratory problems  Aim for a slow, steady weight loss  Even a small amount of weight loss can lower your risk of health problems  How to lose weight safely:  A safe and healthy way to lose weight is to eat fewer calories and get regular exercise  You can lose up about 1 pound a week by decreasing the number of calories you eat by 500 calories each day  Healthy meal plan for weight management:  A healthy meal plan includes a variety of foods, contains fewer calories, and helps you stay healthy  A healthy meal plan includes the following:  · Eat whole-grain foods more often  A healthy meal plan should contain fiber  Fiber is the part of grains, fruits, and vegetables that is not broken down by your body  Whole-grain foods are healthy and provide extra fiber in your diet  Some examples of whole-grain foods are whole-wheat breads and pastas, oatmeal, brown rice, and bulgur  · Eat a variety of vegetables every day  Include dark, leafy greens such as spinach, kale, juan diego greens, and mustard greens  Eat yellow and orange vegetables such as carrots, sweet potatoes, and winter squash  · Eat a variety of fruits every day  Choose fresh or canned fruit (canned in its own juice or light syrup) instead of juice  Fruit juice has very little or no fiber  · Eat low-fat dairy foods  Drink fat-free (skim) milk or 1% milk  Eat fat-free yogurt and low-fat cottage cheese  Try low-fat cheeses such as mozzarella and other reduced-fat cheeses  · Choose meat and other protein foods that are low in fat  Choose beans or other legumes such as split peas or lentils  Choose fish, skinless poultry (chicken or turkey), or lean cuts of red meat (beef or pork)  Before you cook meat or poultry, cut off any visible fat  · Use less fat and oil    Try baking foods instead of frying them  Add less fat, such as margarine, sour cream, regular salad dressing and mayonnaise to foods  Eat fewer high-fat foods  Some examples of high-fat foods include french fries, doughnuts, ice cream, and cakes  · Eat fewer sweets  Limit foods and drinks that are high in sugar  This includes candy, cookies, regular soda, and sweetened drinks  Exercise:  Exercise at least 30 minutes per day on most days of the week  Some examples of exercise include walking, biking, dancing, and swimming  You can also fit in more physical activity by taking the stairs instead of the elevator or parking farther away from stores  Ask your healthcare provider about the best exercise plan for you  © Copyright [employer] 2018 Information is for End User's use only and may not be sold, redistributed or otherwise used for commercial purposes   All illustrations and images included in CareNotes® are the copyrighted property of A [employer] M [employer]  or [address] [place] [address]

## 2023-05-22 NOTE — PROGRESS NOTES
Assessment and Plan:     Medicare annual wellness visit, initial  - screening mammogram, labs, DEXA scan and cologuard ordered and encouraged to complete, patient declines Prevnar-20, Shingrix and Covid vaccines  - Mammo screening bilateral w cad; Future  - Cologuard  - DXA bone density spine hip and pelvis; Future    Hyperlipidemia, unspecified hyperlipidemia type  - discussed dietary changes and regular exercise, will recheck lipids  - Lipid Panel with Direct LDL reflex; Future    Elevated blood pressure reading  - discussed low sodium diet and regular exercise  - Comprehensive metabolic panel; Future  - CBC and differential; Future  - TSH, 3rd generation with Free T4 reflex; Future  - UA (URINE) with reflex to Scope       Preventive health issues were discussed with patient, and age appropriate screening tests were ordered as noted in patient's After Visit Summary  Personalized health advice and appropriate referrals for health education or preventive services given if needed, as noted in patient's After Visit Summary  History of Present Illness:     Patient presents for a Medicare Wellness Visit  Patient offers no complaints at this time  Patient Care Team:  Komal Guerrero MD as PCP - General (Family Medicine)     Review of Systems:     Review of Systems   Constitutional: Negative for appetite change, chills, fatigue, fever and unexpected weight change  HENT: Negative for congestion, ear pain, rhinorrhea and sore throat  Eyes: Negative for pain, discharge, redness, itching and visual disturbance  Respiratory: Negative for cough, shortness of breath and wheezing  Cardiovascular: Negative for chest pain, palpitations and leg swelling  Gastrointestinal: Negative for abdominal pain, blood in stool, constipation, diarrhea, nausea and vomiting  Endocrine: Negative for cold intolerance, heat intolerance, polydipsia and polyuria     Genitourinary: Negative for dysuria, frequency, hematuria, pelvic pain, urgency, vaginal bleeding and vaginal discharge  Musculoskeletal: Negative for arthralgias, back pain, gait problem, joint swelling and myalgias  Skin: Negative for rash  Neurological: Negative for dizziness, syncope, weakness, numbness and headaches  Hematological: Negative for adenopathy  Psychiatric/Behavioral: Negative for dysphoric mood and sleep disturbance  The patient is not nervous/anxious  Problem List:     There is no problem list on file for this patient  Past Medical and Surgical History:     History reviewed  No pertinent past medical history  Past Surgical History:   Procedure Laterality Date   •  SECTION      two   • DENTAL SURGERY        Family History:     Family History   Problem Relation Age of Onset   • Hypertension Mother    • Diabetes Mother    • Prostate cancer Father    • Substance Abuse Brother    • Pancreatic cancer Paternal Grandmother    • Mental illness Neg Hx       Social History:     Social History     Socioeconomic History   • Marital status:      Spouse name: None   • Number of children: None   • Years of education: None   • Highest education level: None   Occupational History   • None   Tobacco Use   • Smoking status: Former   • Smokeless tobacco: Never   Vaping Use   • Vaping Use: Never used   Substance and Sexual Activity   • Alcohol use:  Yes   • Drug use: No   • Sexual activity: None   Other Topics Concern   • None   Social History Narrative   • None     Social Determinants of Health     Financial Resource Strain: Not on file   Food Insecurity: Not on file   Transportation Needs: Not on file   Physical Activity: Not on file   Stress: Not on file   Social Connections: Not on file   Intimate Partner Violence: Not on file   Housing Stability: Not on file      Medications and Allergies:     Current Outpatient Medications   Medication Sig Dispense Refill   • acetaminophen (TYLENOL) 500 mg tablet Take 2 tablets (1,000 mg total) by mouth every 6 (six) hours as needed for mild pain 30 tablet 0   • loratadine (CLARITIN) 10 mg tablet Take 10 mg by mouth daily     • multivitamin (THERAGRAN) TABS Take 1 tablet by mouth daily     • clotrimazole (LOTRIMIN) 1 % cream Apply topically 2 (two) times a day (Patient not taking: Reported on 7/4/2019) 60 g 2   • nystatin-triamcinolone (MYCOLOG-II) cream Apply topically 2 (two) times a day (Patient not taking: Reported on 7/4/2019) 60 g 2   • predniSONE 10 mg tablet Take 4,4,3,3,2,2,1,1 (Patient not taking: Reported on 7/4/2019) 20 tablet 0   • predniSONE 10 mg tablet Take 6 tabs on day 1, 5 tabs on day 2, 4 tabs on day 3, 3 tabs on day 4, 2 tabs on day 5, and 1 tab on day 6 (Patient not taking: Reported on 5/22/2023) 21 tablet 0     No current facility-administered medications for this visit  Allergies   Allergen Reactions   • Other Allergic Rhinitis     Seasonal       Immunizations:     Immunization History   Administered Date(s) Administered   • Tdap 07/21/2015      Health Maintenance:         Topic Date Due   • Colorectal Cancer Screening  Never done   • Breast Cancer Screening: Mammogram  03/27/2019   • Hepatitis C Screening  06/22/2025 (Originally 1955)         Topic Date Due   • COVID-19 Vaccine (1) Never done   • Pneumococcal Vaccine: 65+ Years (1 - PCV) Never done      Medicare Screening Tests and Risk Assessments: Tony Carrasco is here for her Initial Wellness visit  Health Risk Assessment:   Patient rates overall health as very good  Patient feels that their physical health rating is same  Patient is satisfied with their life  Eyesight was rated as same  Hearing was rated as same  Patient feels that their emotional and mental health rating is same  Patients states they are never, rarely angry  Patient states they are never, rarely unusually tired/fatigued  Pain experienced in the last 7 days has been none   Patient states that she has experienced no weight loss or gain in last 6 months  Depression Screening:   PHQ-2 Score: 0      Fall Risk Screening: In the past year, patient has experienced: no history of falling in past year      Urinary Incontinence Screening:   Patient has not leaked urine accidently in the last six months  Home Safety:  Patient does not have trouble with stairs inside or outside of their home  Patient has working smoke alarms and has no working carbon monoxide detector  Home safety hazards include: none  Nutrition:   Current diet is Regular and Limited junk food  Medications:   Patient is currently taking over-the-counter supplements  OTC medications include: see medication list  Patient is able to manage medications  Activities of Daily Living (ADLs)/Instrumental Activities of Daily Living (IADLs):   Walk and transfer into and out of bed and chair?: Yes  Dress and groom yourself?: Yes    Bathe or shower yourself?: Yes    Feed yourself?  Yes  Do your laundry/housekeeping?: Yes  Manage your money, pay your bills and track your expenses?: Yes  Make your own meals?: Yes    Do your own shopping?: Yes    Previous Hospitalizations:   Any hospitalizations or ED visits within the last 12 months?: No      Advance Care Planning:   Living will: No    Advanced directive: No      Cognitive Screening:   Provider or family/friend/caregiver concerned regarding cognition?: No    PREVENTIVE SCREENINGS      Cardiovascular Screening:    General: Screening Not Indicated and History Lipid Disorder    Due for: Lipid Panel      Diabetes Screening:       Due for: Blood Glucose      Colorectal Cancer Screening:       Due for: Cologuard      Breast Cancer Screening:       Due for: Mammogram        Cervical Cancer Screening:    General: Screening Not Indicated      Osteoporosis Screening:      Due for: DXA Axial      Abdominal Aortic Aneurysm (AAA) Screening:        General: Screening Not Indicated      Lung Cancer Screening:     General: Screening Not Indicated "Hepatitis C Screening:    General: Screening Current    Screening, Brief Intervention, and Referral to Treatment (SBIRT)    Screening  Typical number of drinks in a day: 0  Typical number of drinks in a week: 0  Interpretation: Low risk drinking behavior  Single Item Drug Screening:  How often have you used an illegal drug (including marijuana) or a prescription medication for non-medical reasons in the past year? never    Single Item Drug Screen Score: 0  Interpretation: Negative screen for possible drug use disorder       Physical Exam:     /90   Pulse 85   Resp 16   Ht 5' 2\" (1 575 m)   Wt 63 8 kg (140 lb 9 6 oz)   LMP  (LMP Unknown)   BMI 25 72 kg/m²     BP Readings from Last 3 Encounters:   05/22/23 136/82   07/04/19 127/60   11/20/18 104/70     Physical Exam  Constitutional:       General: She is not in acute distress  Appearance: Normal appearance  HENT:      Head: Normocephalic and atraumatic  Right Ear: Tympanic membrane and ear canal normal       Left Ear: Tympanic membrane normal       Mouth/Throat:      Mouth: Mucous membranes are moist       Pharynx: Oropharynx is clear  Eyes:      Extraocular Movements: Extraocular movements intact  Conjunctiva/sclera: Conjunctivae normal       Pupils: Pupils are equal, round, and reactive to light  Cardiovascular:      Rate and Rhythm: Normal rate and regular rhythm  Heart sounds: Normal heart sounds  Pulmonary:      Breath sounds: Normal breath sounds  No wheezing, rhonchi or rales  Abdominal:      General: There is no distension  Palpations: Abdomen is soft  There is no mass  Tenderness: There is no abdominal tenderness  Musculoskeletal:      Cervical back: Neck supple  Right lower leg: No edema  Left lower leg: No edema  Lymphadenopathy:      Cervical: No cervical adenopathy  Neurological:      General: No focal deficit present        Mental Status: She is alert and oriented to person, place, and " time    Psychiatric:         Mood and Affect: Mood normal          Behavior: Behavior normal         Tasia Walden MD

## 2023-08-03 ENCOUNTER — APPOINTMENT (OUTPATIENT)
Dept: LAB | Facility: CLINIC | Age: 68
End: 2023-08-03
Payer: MEDICARE

## 2023-08-03 DIAGNOSIS — E78.5 HYPERLIPIDEMIA, UNSPECIFIED HYPERLIPIDEMIA TYPE: ICD-10-CM

## 2023-08-03 DIAGNOSIS — E55.9 VITAMIN D DEFICIENCY: ICD-10-CM

## 2023-08-03 DIAGNOSIS — R53.83 FATIGUE, UNSPECIFIED TYPE: ICD-10-CM

## 2023-08-03 LAB
25(OH)D3 SERPL-MCNC: 42.8 NG/ML (ref 30–100)
ALBUMIN SERPL BCP-MCNC: 3.8 G/DL (ref 3.5–5)
ALP SERPL-CCNC: 66 U/L (ref 46–116)
ALT SERPL W P-5'-P-CCNC: 24 U/L (ref 12–78)
ANION GAP SERPL CALCULATED.3IONS-SCNC: 5 MMOL/L
AST SERPL W P-5'-P-CCNC: 21 U/L (ref 5–45)
BACTERIA UR QL AUTO: NORMAL /HPF
BASOPHILS # BLD AUTO: 0.06 THOUSANDS/ÂΜL (ref 0–0.1)
BASOPHILS NFR BLD AUTO: 1 % (ref 0–1)
BILIRUB SERPL-MCNC: 0.32 MG/DL (ref 0.2–1)
BILIRUB UR QL STRIP: NEGATIVE
BUN SERPL-MCNC: 11 MG/DL (ref 5–25)
CALCIUM SERPL-MCNC: 9.2 MG/DL (ref 8.3–10.1)
CHLORIDE SERPL-SCNC: 106 MMOL/L (ref 96–108)
CHOLEST SERPL-MCNC: 289 MG/DL
CLARITY UR: CLEAR
CO2 SERPL-SCNC: 29 MMOL/L (ref 21–32)
COLOR UR: ABNORMAL
CREAT SERPL-MCNC: 0.68 MG/DL (ref 0.6–1.3)
EOSINOPHIL # BLD AUTO: 0.21 THOUSAND/ÂΜL (ref 0–0.61)
EOSINOPHIL NFR BLD AUTO: 3 % (ref 0–6)
ERYTHROCYTE [DISTWIDTH] IN BLOOD BY AUTOMATED COUNT: 14.6 % (ref 11.6–15.1)
GFR SERPL CREATININE-BSD FRML MDRD: 90 ML/MIN/1.73SQ M
GLUCOSE P FAST SERPL-MCNC: 91 MG/DL (ref 65–99)
GLUCOSE UR STRIP-MCNC: NEGATIVE MG/DL
HCT VFR BLD AUTO: 39.6 % (ref 34.8–46.1)
HDLC SERPL-MCNC: 92 MG/DL
HGB BLD-MCNC: 13 G/DL (ref 11.5–15.4)
HGB UR QL STRIP.AUTO: NEGATIVE
IMM GRANULOCYTES # BLD AUTO: 0.04 THOUSAND/UL (ref 0–0.2)
IMM GRANULOCYTES NFR BLD AUTO: 1 % (ref 0–2)
KETONES UR STRIP-MCNC: NEGATIVE MG/DL
LDLC SERPL CALC-MCNC: 174 MG/DL (ref 0–100)
LEUKOCYTE ESTERASE UR QL STRIP: NEGATIVE
LYMPHOCYTES # BLD AUTO: 2.34 THOUSANDS/ÂΜL (ref 0.6–4.47)
LYMPHOCYTES NFR BLD AUTO: 28 % (ref 14–44)
MCH RBC QN AUTO: 31 PG (ref 26.8–34.3)
MCHC RBC AUTO-ENTMCNC: 32.8 G/DL (ref 31.4–37.4)
MCV RBC AUTO: 94 FL (ref 82–98)
MONOCYTES # BLD AUTO: 0.74 THOUSAND/ÂΜL (ref 0.17–1.22)
MONOCYTES NFR BLD AUTO: 9 % (ref 4–12)
NEUTROPHILS # BLD AUTO: 4.86 THOUSANDS/ÂΜL (ref 1.85–7.62)
NEUTS SEG NFR BLD AUTO: 58 % (ref 43–75)
NITRITE UR QL STRIP: NEGATIVE
NON-SQ EPI CELLS URNS QL MICRO: NORMAL /HPF
NRBC BLD AUTO-RTO: 0 /100 WBCS
PH UR STRIP.AUTO: 7 [PH]
PLATELET # BLD AUTO: 342 THOUSANDS/UL (ref 149–390)
PMV BLD AUTO: 10.5 FL (ref 8.9–12.7)
POTASSIUM SERPL-SCNC: 4.2 MMOL/L (ref 3.5–5.3)
PROT SERPL-MCNC: 7.4 G/DL (ref 6.4–8.4)
PROT UR STRIP-MCNC: ABNORMAL MG/DL
RBC # BLD AUTO: 4.2 MILLION/UL (ref 3.81–5.12)
RBC #/AREA URNS AUTO: NORMAL /HPF
SODIUM SERPL-SCNC: 140 MMOL/L (ref 135–147)
SP GR UR STRIP.AUTO: 1.01 (ref 1–1.03)
TRIGL SERPL-MCNC: 114 MG/DL
TSH SERPL DL<=0.05 MIU/L-ACNC: 2.71 UIU/ML (ref 0.45–4.5)
UROBILINOGEN UR STRIP-ACNC: <2 MG/DL
WBC # BLD AUTO: 8.25 THOUSAND/UL (ref 4.31–10.16)
WBC #/AREA URNS AUTO: NORMAL /HPF

## 2023-08-03 PROCEDURE — 82306 VITAMIN D 25 HYDROXY: CPT

## 2023-08-03 PROCEDURE — 85025 COMPLETE CBC W/AUTO DIFF WBC: CPT

## 2023-08-03 PROCEDURE — 36415 COLL VENOUS BLD VENIPUNCTURE: CPT

## 2023-08-03 PROCEDURE — 84443 ASSAY THYROID STIM HORMONE: CPT

## 2023-08-03 PROCEDURE — 80053 COMPREHEN METABOLIC PANEL: CPT

## 2023-08-03 PROCEDURE — 80061 LIPID PANEL: CPT

## 2023-08-31 ENCOUNTER — TELEPHONE (OUTPATIENT)
Dept: FAMILY MEDICINE CLINIC | Facility: CLINIC | Age: 68
End: 2023-08-31

## 2023-08-31 NOTE — TELEPHONE ENCOUNTER
Pt calling regarding her blood test result you left her a message to call back so she is returning your call.

## 2023-09-01 ENCOUNTER — OFFICE VISIT (OUTPATIENT)
Dept: FAMILY MEDICINE CLINIC | Facility: CLINIC | Age: 68
End: 2023-09-01
Payer: MEDICARE

## 2023-09-01 VITALS
TEMPERATURE: 98.1 F | SYSTOLIC BLOOD PRESSURE: 128 MMHG | BODY MASS INDEX: 24.73 KG/M2 | DIASTOLIC BLOOD PRESSURE: 70 MMHG | OXYGEN SATURATION: 96 % | HEIGHT: 62 IN | WEIGHT: 134.4 LBS | HEART RATE: 75 BPM | RESPIRATION RATE: 14 BRPM

## 2023-09-01 DIAGNOSIS — R23.8 IRRITATION SYMPTOM OF SKIN: Primary | ICD-10-CM

## 2023-09-01 DIAGNOSIS — Q89.9 UMBILICAL ABNORMALITY: ICD-10-CM

## 2023-09-01 PROCEDURE — 99214 OFFICE O/P EST MOD 30 MIN: CPT | Performed by: NURSE PRACTITIONER

## 2023-09-01 RX ORDER — CEPHALEXIN 500 MG/1
500 CAPSULE ORAL 3 TIMES DAILY
Qty: 21 CAPSULE | Refills: 0 | Status: SHIPPED | OUTPATIENT
Start: 2023-09-01 | End: 2023-09-08

## 2023-09-01 NOTE — PROGRESS NOTES
Assessment/Plan:     Diagnoses and all orders for this visit:    Irritation symptom of skin  -     cephalexin (KEFLEX) 500 mg capsule; Take 1 capsule (500 mg total) by mouth 3 (three) times a day for 7 days    Pt to wash area daily and PRN w/ warm soapy water and pat dry. She is to avoid anything topical such as antibiotic ointment or lotion to the area. She is to monitor the area for any increasing redness, warmth, discharge, or fever or chills. If these symptoms occur, she may start the Keflex that was prescribed. Medication and s/e reviewed. Patient is encouraged to call our office for any questions/concerns, persistent or worsening symptoms. Patient states they understand and agree with treatment plan. Umbilical abnormality      Same as above. Subjective:      Patient ID: Kurtis Marshall is a 76 y.o. female. Pt presents today for drainage from her umbilicus on Tuesday. She notes that she removed a hardened piece about the size of a pea from her umbilicus. Pt then used antibiotic ointment to her umbilicus, but feels that it just made the area red. She denies any new drainage from the area. No swelling, drainage, fever, chills. The following portions of the patient's history were reviewed and updated as appropriate: allergies, current medications, past family history, past medical history, past social history, past surgical history and problem list.    Review of Systems    As noted per HPI. Objective:      /70   Pulse 75   Temp 98.1 °F (36.7 °C) (Oral)   Resp 14   Ht 5' 2" (1.575 m)   Wt 61 kg (134 lb 6.4 oz)   LMP  (LMP Unknown)   SpO2 96%   BMI 24.58 kg/m²          Physical Exam  Vitals reviewed. Constitutional:       Appearance: Normal appearance. Pulmonary:      Effort: Pulmonary effort is normal.   Skin:         Neurological:      Mental Status: She is alert and oriented to person, place, and time. Mental status is at baseline.    Psychiatric:         Mood and Affect: Mood normal.         Behavior: Behavior normal.         Thought Content:  Thought content normal.         Judgment: Judgment normal.

## 2024-01-17 ENCOUNTER — RA CDI HCC (OUTPATIENT)
Dept: OTHER | Facility: HOSPITAL | Age: 69
End: 2024-01-17

## 2024-05-09 ENCOUNTER — HOSPITAL ENCOUNTER (OUTPATIENT)
Dept: BONE DENSITY | Facility: IMAGING CENTER | Age: 69
Discharge: HOME | End: 2024-05-09
Payer: MEDICARE

## 2024-05-09 VITALS — HEIGHT: 62 IN | WEIGHT: 136.6 LBS | BODY MASS INDEX: 25.14 KG/M2

## 2024-05-09 DIAGNOSIS — Z13.820 SPECIAL SCREENING FOR OSTEOPOROSIS: ICD-10-CM

## 2024-05-09 PROCEDURE — 77080 DXA BONE DENSITY AXIAL: CPT

## 2024-05-31 ENCOUNTER — OFFICE VISIT (OUTPATIENT)
Dept: FAMILY MEDICINE CLINIC | Facility: CLINIC | Age: 69
End: 2024-05-31
Payer: MEDICARE

## 2024-05-31 VITALS
WEIGHT: 138.2 LBS | HEART RATE: 83 BPM | BODY MASS INDEX: 24.49 KG/M2 | DIASTOLIC BLOOD PRESSURE: 88 MMHG | HEIGHT: 63 IN | SYSTOLIC BLOOD PRESSURE: 132 MMHG | OXYGEN SATURATION: 99 % | TEMPERATURE: 97.7 F | RESPIRATION RATE: 16 BRPM

## 2024-05-31 DIAGNOSIS — Z00.00 MEDICARE ANNUAL WELLNESS VISIT, SUBSEQUENT: Primary | ICD-10-CM

## 2024-05-31 DIAGNOSIS — E78.5 HYPERLIPIDEMIA, UNSPECIFIED HYPERLIPIDEMIA TYPE: ICD-10-CM

## 2024-05-31 DIAGNOSIS — R03.0 ELEVATED BLOOD PRESSURE READING: ICD-10-CM

## 2024-05-31 DIAGNOSIS — R53.83 FATIGUE, UNSPECIFIED TYPE: ICD-10-CM

## 2024-05-31 DIAGNOSIS — Z12.11 SCREENING FOR COLON CANCER: ICD-10-CM

## 2024-05-31 DIAGNOSIS — M85.80 OSTEOPENIA, UNSPECIFIED LOCATION: ICD-10-CM

## 2024-05-31 PROCEDURE — 99213 OFFICE O/P EST LOW 20 MIN: CPT | Performed by: FAMILY MEDICINE

## 2024-05-31 PROCEDURE — G0439 PPPS, SUBSEQ VISIT: HCPCS | Performed by: FAMILY MEDICINE

## 2024-05-31 NOTE — PATIENT INSTRUCTIONS
Medicare Preventive Visit Patient Instructions  Thank you for completing your Welcome to Medicare Visit or Medicare Annual Wellness Visit today. Your next wellness visit will be due in one year (6/1/2025).  The screening/preventive services that you may require over the next 5-10 years are detailed below. Some tests may not apply to you based off risk factors and/or age. Screening tests ordered at today's visit but not completed yet may show as past due. Also, please note that scanned in results may not display below.  Preventive Screenings:  Service Recommendations Previous Testing/Comments   Colorectal Cancer Screening  * Colonoscopy    * Fecal Occult Blood Test (FOBT)/Fecal Immunochemical Test (FIT)  * Fecal DNA/Cologuard Test  * Flexible Sigmoidoscopy Age: 45-75 years old   Colonoscopy: every 10 years (may be performed more frequently if at higher risk)  OR  FOBT/FIT: every 1 year  OR  Cologuard: every 3 years  OR  Sigmoidoscopy: every 5 years  Screening may be recommended earlier than age 45 if at higher risk for colorectal cancer. Also, an individualized decision between you and your healthcare provider will decide whether screening between the ages of 76-85 would be appropriate. Colonoscopy: Not on file  FOBT/FIT: Not on file  Cologuard: Not on file  Sigmoidoscopy: Not on file          Breast Cancer Screening Age: 40+ years old  Frequency: every 1-2 years  Not required if history of left and right mastectomy Mammogram: 03/27/2018        Cervical Cancer Screening Between the ages of 21-29, pap smear recommended once every 3 years.   Between the ages of 30-65, can perform pap smear with HPV co-testing every 5 years.   Recommendations may differ for women with a history of total hysterectomy, cervical cancer, or abnormal pap smears in past. Pap Smear: Not on file    Screening Not Indicated   Hepatitis C Screening Once for adults born between 1945 and 1965  More frequently in patients at high risk for Hepatitis C  Hep C Antibody: Not on file    Screening Current   Diabetes Screening 1-2 times per year if you're at risk for diabetes or have pre-diabetes Fasting glucose: 91 mg/dL (8/3/2023)  A1C: No results in last 5 years (No results in last 5 years)  Screening Current   Cholesterol Screening Once every 5 years if you don't have a lipid disorder. May order more often based on risk factors. Lipid panel: 08/03/2023    Screening Not Indicated  History Lipid Disorder     Other Preventive Screenings Covered by Medicare:  Abdominal Aortic Aneurysm (AAA) Screening: covered once if your at risk. You're considered to be at risk if you have a family history of AAA.  Lung Cancer Screening: covers low dose CT scan once per year if you meet all of the following conditions: (1) Age 55-77; (2) No signs or symptoms of lung cancer; (3) Current smoker or have quit smoking within the last 15 years; (4) You have a tobacco smoking history of at least 20 pack years (packs per day multiplied by number of years you smoked); (5) You get a written order from a healthcare provider.  Glaucoma Screening: covered annually if you're considered high risk: (1) You have diabetes OR (2) Family history of glaucoma OR (3)  aged 50 and older OR (4)  American aged 65 and older  Osteoporosis Screening: covered every 2 years if you meet one of the following conditions: (1) You're estrogen deficient and at risk for osteoporosis based off medical history and other findings; (2) Have a vertebral abnormality; (3) On glucocorticoid therapy for more than 3 months; (4) Have primary hyperparathyroidism; (5) On osteoporosis medications and need to assess response to drug therapy.   Last bone density test (DXA Scan): 05/09/2024.  HIV Screening: covered annually if you're between the age of 15-65. Also covered annually if you are younger than 15 and older than 65 with risk factors for HIV infection. For pregnant patients, it is covered up to 3 times per  pregnancy.    Immunizations:  Immunization Recommendations   Influenza Vaccine Annual influenza vaccination during flu season is recommended for all persons aged >= 6 months who do not have contraindications   Pneumococcal Vaccine   * Pneumococcal conjugate vaccine = PCV13 (Prevnar 13), PCV15 (Vaxneuvance), PCV20 (Prevnar 20)  * Pneumococcal polysaccharide vaccine = PPSV23 (Pneumovax) Adults 19-63 yo with certain risk factors or if 65+ yo  If never received any pneumonia vaccine: recommend Prevnar 20 (PCV20)  Give PCV20 if previously received 1 dose of PCV13 or PPSV23   Hepatitis B Vaccine 3 dose series if at intermediate or high risk (ex: diabetes, end stage renal disease, liver disease)   Respiratory syncytial virus (RSV) Vaccine - COVERED BY MEDICARE PART D  * RSVPreF3 (Arexvy) CDC recommends that adults 60 years of age and older may receive a single dose of RSV vaccine using shared clinical decision-making (SCDM)   Tetanus (Td) Vaccine - COST NOT COVERED BY MEDICARE PART B Following completion of primary series, a booster dose should be given every 10 years to maintain immunity against tetanus. Td may also be given as tetanus wound prophylaxis.   Tdap Vaccine - COST NOT COVERED BY MEDICARE PART B Recommended at least once for all adults. For pregnant patients, recommended with each pregnancy.   Shingles Vaccine (Shingrix) - COST NOT COVERED BY MEDICARE PART B  2 shot series recommended in those 19 years and older who have or will have weakened immune systems or those 50 years and older     Health Maintenance Due:      Topic Date Due   • Colorectal Cancer Screening  Never done   • Breast Cancer Screening: Mammogram  03/27/2019   • Hepatitis C Screening  06/22/2025 (Originally 1955)     Immunizations Due:      Topic Date Due   • Pneumococcal Vaccine: 65+ Years (1 of 1 - PCV) Never done     Advance Directives   What are advance directives?  Advance directives are legal documents that state your wishes and plans  for medical care. These plans are made ahead of time in case you lose your ability to make decisions for yourself. Advance directives can apply to any medical decision, such as the treatments you want, and if you want to donate organs.   What are the types of advance directives?  There are many types of advance directives, and each state has rules about how to use them. You may choose a combination of any of the following:  Living will:  This is a written record of the treatment you want. You can also choose which treatments you do not want, which to limit, and which to stop at a certain time. This includes surgery, medicine, IV fluid, and tube feedings.   Durable power of  for healthcare (DPAHC):  This is a written record that states who you want to make healthcare choices for you when you are unable to make them for yourself. This person, called a proxy, is usually a family member or a friend. You may choose more than 1 proxy.  Do not resuscitate (DNR) order:  A DNR order is used in case your heart stops beating or you stop breathing. It is a request not to have certain forms of treatment, such as CPR. A DNR order may be included in other types of advance directives.  Medical directive:  This covers the care that you want if you are in a coma, near death, or unable to make decisions for yourself. You can list the treatments you want for each condition. Treatment may include pain medicine, surgery, blood transfusions, dialysis, IV or tube feedings, and a ventilator (breathing machine).  Values history:  This document has questions about your views, beliefs, and how you feel and think about life. This information can help others choose the care that you would choose.  Why are advance directives important?  An advance directive helps you control your care. Although spoken wishes may be used, it is better to have your wishes written down. Spoken wishes can be misunderstood, or not followed. Treatments may be  given even if you do not want them. An advance directive may make it easier for your family to make difficult choices about your care.   Weight Management   Why it is important to manage your weight:  Being overweight increases your risk of health conditions such as heart disease, high blood pressure, type 2 diabetes, and certain types of cancer. It can also increase your risk for osteoarthritis, sleep apnea, and other respiratory problems. Aim for a slow, steady weight loss. Even a small amount of weight loss can lower your risk of health problems.  How to lose weight safely:  A safe and healthy way to lose weight is to eat fewer calories and get regular exercise. You can lose up about 1 pound a week by decreasing the number of calories you eat by 500 calories each day.   Healthy meal plan for weight management:  A healthy meal plan includes a variety of foods, contains fewer calories, and helps you stay healthy. A healthy meal plan includes the following:  Eat whole-grain foods more often.  A healthy meal plan should contain fiber. Fiber is the part of grains, fruits, and vegetables that is not broken down by your body. Whole-grain foods are healthy and provide extra fiber in your diet. Some examples of whole-grain foods are whole-wheat breads and pastas, oatmeal, brown rice, and bulgur.  Eat a variety of vegetables every day.  Include dark, leafy greens such as spinach, kale, juan diego greens, and mustard greens. Eat yellow and orange vegetables such as carrots, sweet potatoes, and winter squash.   Eat a variety of fruits every day.  Choose fresh or canned fruit (canned in its own juice or light syrup) instead of juice. Fruit juice has very little or no fiber.  Eat low-fat dairy foods.  Drink fat-free (skim) milk or 1% milk. Eat fat-free yogurt and low-fat cottage cheese. Try low-fat cheeses such as mozzarella and other reduced-fat cheeses.  Choose meat and other protein foods that are low in fat.  Choose beans or  other legumes such as split peas or lentils. Choose fish, skinless poultry (chicken or turkey), or lean cuts of red meat (beef or pork). Before you cook meat or poultry, cut off any visible fat.   Use less fat and oil.  Try baking foods instead of frying them. Add less fat, such as margarine, sour cream, regular salad dressing and mayonnaise to foods. Eat fewer high-fat foods. Some examples of high-fat foods include french fries, doughnuts, ice cream, and cakes.  Eat fewer sweets.  Limit foods and drinks that are high in sugar. This includes candy, cookies, regular soda, and sweetened drinks.  Exercise:  Exercise at least 30 minutes per day on most days of the week. Some examples of exercise include walking, biking, dancing, and swimming. You can also fit in more physical activity by taking the stairs instead of the elevator or parking farther away from stores. Ask your healthcare provider about the best exercise plan for you.      © Copyright [employer] 2018 Information is for End User's use only and may not be sold, redistributed or otherwise used for commercial purposes. All illustrations and images included in CareNotes® are the copyrighted property of [employer] or [employer]

## 2024-05-31 NOTE — PROGRESS NOTES
Ambulatory Visit  Name: Paula Schwab      : 1955      MRN: [id number]  Encounter Provider: Thuy Villegas MD  Encounter Date: 2024   Encounter department: [facility]    Assessment & Plan     Medicare annual wellness visit, subsequent  - discussed Shingrix, Prevnar-20  - mammogram scheduled on 24    Hyperlipidemia  - recent , advised to work on healthy low cholesterol diet and regular exercise, will recheck lipids, also discussed coronary calcium score  - Lipid Panel with Direct LDL reflex; Future    Osteopenia  - DEXA scan 24 showed T-score -2.3 in lumbar spine, -2.0 in femoral neck, advised to continue calcium and vitamin D supplements, discussed regular weight bearing exercises, will recheck DEXA scan in 2026    Elevated blood pressure reading  - discussed low sodium diet and regular exercise, will check labs  - Comprehensive metabolic panel; Future  - CBC and differential; Future  - TSH, 3rd generation with Free T4 reflex; Future  - UA (URINE) with reflex to Scope; Future    Screening for colon cancer  - Cologuard        Return in 1 year or sooner as needed. Preventive health issues were discussed with patient, and age appropriate screening tests were ordered as noted in patient's After Visit Summary. Personalized health advice and appropriate referrals for health education or preventive services given if needed, as noted in patient's After Visit Summary.    History of Present Illness   Patient presents for annual wellness visit, follow up for hyperlipidemia and review her DEXA scan results. Patient offers no complaints at this time.     Hyperlipidemia  Pertinent negatives include no chest pain, myalgias or shortness of breath.      Patient Care Team:  Thuy Villegas MD as PCP - General (Family Medicine)    Review of Systems   Constitutional:  Negative for appetite change, chills, fatigue, fever and unexpected weight change.   HENT:   Negative for congestion, ear pain, rhinorrhea and sore throat.    Eyes:  Negative for pain, discharge, redness, itching and visual disturbance.   Respiratory:  Negative for cough, shortness of breath and wheezing.    Cardiovascular:  Negative for chest pain, palpitations and leg swelling.   Gastrointestinal:  Negative for abdominal pain, blood in stool, constipation, diarrhea, nausea and vomiting.   Endocrine: Negative for cold intolerance, heat intolerance, polydipsia and polyuria.   Genitourinary:  Negative for dysuria, frequency, hematuria, pelvic pain, urgency, vaginal bleeding and vaginal discharge.   Musculoskeletal:  Negative for arthralgias, back pain, gait problem, joint swelling and myalgias.   Skin:  Negative for rash.   Neurological:  Negative for dizziness, syncope, weakness, numbness and headaches.   Hematological:  Negative for adenopathy.   Psychiatric/Behavioral:  Negative for dysphoric mood and sleep disturbance. The patient is not nervous/anxious.      Medical History Reviewed by provider this encounter:       Annual Wellness Visit Questionnaire   Paula is here for her Subsequent Wellness visit.     Health Risk Assessment:   Patient rates overall health as very good. Patient feels that their physical health rating is same. Patient is satisfied with their life. Eyesight was rated as same. Hearing was rated as same. Patient feels that their emotional and mental health rating is same. Patients states they are never, rarely angry. Patient states they are never, rarely unusually tired/fatigued. Pain experienced in the last 7 days has been none. Patient states that she has experienced no weight loss or gain in last 6 months.     Depression Screening:   PHQ-2 Score: 0      Fall Risk Screening:   In the past year, patient has experienced: no history of falling in past year      Urinary Incontinence Screening:   Patient has not leaked urine accidently in the last six months.     Home Safety:  Patient does  not have trouble with stairs inside or outside of their home. Patient has working smoke alarms and has working carbon monoxide detector. Home safety hazards include: none.     Nutrition:   Current diet is Regular.     Medications:   Patient is currently taking over-the-counter supplements. OTC medications include: see medication list. Patient is able to manage medications.     Activities of Daily Living (ADLs)/Instrumental Activities of Daily Living (IADLs):   Walk and transfer into and out of bed and chair?: Yes  Dress and groom yourself?: Yes    Bathe or shower yourself?: Yes    Feed yourself? Yes  Do your laundry/housekeeping?: Yes  Manage your money, pay your bills and track your expenses?: Yes  Make your own meals?: Yes    Do your own shopping?: Yes    Previous Hospitalizations:   Any hospitalizations or ED visits within the last 12 months?: No      Advance Care Planning:   Living will: No    Advanced directive: Yes      Cognitive Screening:   Provider or family/friend/caregiver concerned regarding cognition?: No    PREVENTIVE SCREENINGS      Cardiovascular Screening:    General: Screening Not Indicated and History Lipid Disorder      Diabetes Screening:     General: Screening Current      Colorectal Cancer Screening:     General: Risks and Benefits Discussed    Due for: Cologuard      Breast Cancer Screening:       Due for: Mammogram        Cervical Cancer Screening:    General: Screening Not Indicated      Osteoporosis Screening:    General: Screening Current      Abdominal Aortic Aneurysm (AAA) Screening:        General: Screening Not Indicated      Lung Cancer Screening:     General: Screening Not Indicated      Hepatitis C Screening:    General: Screening Current    Screening, Brief Intervention, and Referral to Treatment (SBIRT)    Screening  Typical number of drinks in a day: 0  Typical number of drinks in a week: 0  Interpretation: Low risk drinking behavior.    Single Item Drug Screening:  How often  have you used an illegal drug (including marijuana) or a prescription medication for non-medical reasons in the past year? never    Single Item Drug Screen Score: 0  Interpretation: Negative screen for possible drug use disorder    Social Determinants of Health     Financial Resource Strain: Low Risk  (5/22/2023)    Overall Financial Resource Strain (CARDIA)     Difficulty of Paying Living Expenses: Not hard at all   Transportation Needs: No Transportation Needs (5/22/2023)    PRAPARE - Transportation     Lack of Transportation (Medical): No     Lack of Transportation (Non-Medical): No     No results found.    Objective     LMP  (LMP Unknown)     Physical Exam  Constitutional:       General: She is not in acute distress.     Appearance: Normal appearance.   HENT:      Right Ear: Tympanic membrane and ear canal normal.      Left Ear: Tympanic membrane normal.      Mouth/Throat:      Mouth: Mucous membranes are moist.      Pharynx: Oropharynx is clear.   Eyes:      Extraocular Movements: Extraocular movements intact.      Conjunctiva/sclera: Conjunctivae normal.      Pupils: Pupils are equal, round, and reactive to light.   Cardiovascular:      Rate and Rhythm: Normal rate and regular rhythm.      Heart sounds: Normal heart sounds.   Pulmonary:      Breath sounds: Normal breath sounds. No wheezing, rhonchi or rales.   Abdominal:      General: There is no distension.      Palpations: Abdomen is soft. There is no mass.      Tenderness: There is no abdominal tenderness.   Musculoskeletal:      Cervical back: Neck supple.      Right lower leg: No edema.      Left lower leg: No edema.   Lymphadenopathy:      Cervical: No cervical adenopathy.   Neurological:      General: No focal deficit present.      Mental Status: She is alert and oriented to person, place, and time.   Psychiatric:         Mood and Affect: Mood normal.         Behavior: Behavior normal.

## 2024-06-11 ENCOUNTER — HOSPITAL ENCOUNTER (OUTPATIENT)
Dept: ULTRASOUND IMAGING | Facility: CLINIC | Age: 69
Discharge: HOME | End: 2024-06-11
Payer: MEDICARE

## 2024-06-11 ENCOUNTER — HOSPITAL ENCOUNTER (OUTPATIENT)
Dept: MAMMOGRAPHY | Facility: CLINIC | Age: 69
Discharge: HOME | End: 2024-06-11
Payer: MEDICARE

## 2024-06-11 VITALS — WEIGHT: 138 LBS | HEIGHT: 63 IN | BODY MASS INDEX: 24.45 KG/M2

## 2024-06-11 DIAGNOSIS — N64.4 BREAST PAIN, RIGHT: ICD-10-CM

## 2024-06-11 PROCEDURE — 76642 ULTRASOUND BREAST LIMITED: CPT

## 2024-06-11 PROCEDURE — G0279 TOMOSYNTHESIS, MAMMO: HCPCS

## 2024-06-11 PROCEDURE — 77066 DX MAMMO INCL CAD BI: CPT

## 2024-12-19 ENCOUNTER — TELEPHONE (OUTPATIENT)
Age: 69
End: 2024-12-19

## 2024-12-19 NOTE — TELEPHONE ENCOUNTER
LMOM for patient to let her know she does owe that bill.  Has to pay towards deductible.  She had an ultrasound done after mamm the deductible is for the ultrasound.  Has to pay that bill.

## 2024-12-19 NOTE — TELEPHONE ENCOUNTER
Not sure who can help. Patient called stating that she is on medicare and received bill for Mammogram that was done on 6/11/24. Suggested her to call the number on the bill. She also mentioned that on her my chart when she clicks on her 5/9/24 Dexa scan after visit it shows Mammogram even though it was dexa scan. She thinks maybe that is why she was being charged for Mammogram.  Please help to sort it out. She has US scheduled for tomorrow as well. Provided her [facility] number as well. She was in a rush so did not connect her.    Please call to advise. Patient stated that mammogram once a year should be free.    Thank you!!

## 2024-12-20 ENCOUNTER — HOSPITAL ENCOUNTER (OUTPATIENT)
Dept: ULTRASOUND IMAGING | Facility: CLINIC | Age: 69
Discharge: HOME | End: 2024-12-20
Payer: MEDICARE

## 2024-12-20 DIAGNOSIS — R92.8 ABNORMAL FINDINGS ON DIAGNOSTIC IMAGING OF BREAST: ICD-10-CM

## 2024-12-20 PROCEDURE — 76642 ULTRASOUND BREAST LIMITED: CPT

## 2025-06-30 ENCOUNTER — OFFICE VISIT (OUTPATIENT)
Dept: URGENT CARE | Facility: CLINIC | Age: 70
End: 2025-06-30
Payer: MEDICARE

## 2025-06-30 VITALS
DIASTOLIC BLOOD PRESSURE: 66 MMHG | OXYGEN SATURATION: 98 % | WEIGHT: 135 LBS | BODY MASS INDEX: 24.84 KG/M2 | HEIGHT: 62 IN | SYSTOLIC BLOOD PRESSURE: 118 MMHG | TEMPERATURE: 97.6 F | RESPIRATION RATE: 16 BRPM | HEART RATE: 90 BPM

## 2025-06-30 DIAGNOSIS — L23.7 POISON IVY: Primary | ICD-10-CM

## 2025-06-30 PROCEDURE — G0463 HOSPITAL OUTPT CLINIC VISIT: HCPCS | Performed by: PHYSICIAN ASSISTANT

## 2025-06-30 PROCEDURE — 99203 OFFICE O/P NEW LOW 30 MIN: CPT | Performed by: PHYSICIAN ASSISTANT

## 2025-06-30 RX ORDER — PREDNISONE 10 MG/1
10 TABLET ORAL DAILY
Qty: 5 TABLET | Refills: 0 | Status: SHIPPED | OUTPATIENT
Start: 2025-06-30 | End: 2025-07-01

## 2025-06-30 NOTE — PATIENT INSTRUCTIONS
Patient was educated on poison ivy.  Patient was told to keep areas of poison ivy on left arm clean and dry.  Patient was educated on soap and water over poison ivy.  Patient was educated on steroids.  Do not take any anti-inflammatories while on steroids.  Any chest pain or shortness of breath go directly to ED.  Follow-up over the next few days with your PCP.

## 2025-06-30 NOTE — PROGRESS NOTES
[facility]        NAME: Paula Schwab is a 69 y.o. female  : 1955    MRN: [id number]  DATE: 2025  TIME: 5:31 PM    Assessment and Plan   Poison ivy [L23.7]  1. Poison ivy  predniSONE 10 mg tablet        Patient was offered a longer course of steroids however requested a short course due to concerns for decreasing her immune system and osteopenia.    Patient Instructions   Patient was educated on poison ivy.  Patient was told to keep areas of poison ivy on left arm clean and dry.  Patient was educated on soap and water over poison ivy.  Patient was educated on steroids.  Do not take any anti-inflammatories while on steroids.  Any chest pain or shortness of breath go directly to ED.  Follow-up over the next few days with your PCP.    Follow up with PCP in 3-5 days.  Proceed to  ER if symptoms worsen.    If tests have been performed at [facility], our office will contact you with results if changes need to be made to the care plan discussed with you at the visit.  You can review your full results on [facility].    Chief Complaint     Chief Complaint   Patient presents with    Rash     Started last Monday with an itchy rash on her left arm after she was mowing the grass, blistering, spreading to her left hand, denies drainage, denies open areas, she's been managing with anti-itch cream          History of Present Illness       Patient is a pleasant 69-year-old female who presents today to the urgent care complaining of rash on left arm.  Patient reports rash on left arm started about a week ago.  Patient is concern for exposure to poison ivy.  Denies any chest pain or shortness of breath.  Patient voiced concerns for osteopenia and lowering her immune system by taking steroids.  Patient is currently using over-the-counter hydrocortisone 1% cream with minimal relief.  Denies any allergies to medications.  Denies any history of diabetes.    Rash        Review of Systems   Review of Systems  "  Constitutional: Negative.    Respiratory: Negative.     Cardiovascular: Negative.    Skin:  Positive for rash.        Poison ivy noted on left arm.   Psychiatric/Behavioral: Negative.           Current Medications     Current Medications[1]    Current Allergies     Allergies as of 06/30/2025 - Reviewed 06/30/2025   Allergen Reaction Noted    Other Allergic Rhinitis 02/23/2018            The following portions of the patient's history were reviewed and updated as appropriate: allergies, current medications, past family history, past medical history, past social history, past surgical history and problem list.     Past Medical History[2]    Past Surgical History[3]    Family History[4]      Medications have been verified.        Objective   /66 (BP Location: Right arm, Patient Position: Sitting, Cuff Size: Standard)   Pulse 90   Temp 97.6 °F (36.4 °C) (Tympanic)   Resp 16   Ht 5' 2.25\" (1.581 m)   Wt 61.2 kg (135 lb)   LMP  (LMP Unknown)   SpO2 98%   BMI 24.49 kg/m²   No LMP recorded (lmp unknown). Patient is postmenopausal.       Physical Exam     Physical Exam  Vitals and nursing note reviewed.   Constitutional:       Appearance: Normal appearance.   HENT:      Head: Normocephalic.     Cardiovascular:      Rate and Rhythm: Normal rate and regular rhythm.      Heart sounds: Normal heart sounds.   Pulmonary:      Breath sounds: Normal breath sounds. No wheezing.     Skin:     Comments: Poison ivy noted on left arm with small vesicles.  No signs of infection.  Vesicles are currently filled with no current discharge.     Neurological:      General: No focal deficit present.      Mental Status: She is alert and oriented to person, place, and time.     Psychiatric:         Mood and Affect: Mood normal.         Behavior: Behavior normal.                        [1]   Current Outpatient Medications:     predniSONE 10 mg tablet, Take 1 tablet (10 mg total) by mouth daily for 5 days, Disp: 5 tablet, Rfl: 0    " acetaminophen (TYLENOL) 500 mg tablet, Take 2 tablets (1,000 mg total) by mouth every 6 (six) hours as needed for mild pain, Disp: 30 tablet, Rfl: 0    loratadine (CLARITIN) 10 mg tablet, Take 10 mg by mouth daily, Disp: , Rfl:     multivitamin (THERAGRAN) TABS, Take 1 tablet by mouth daily, Disp: , Rfl:   [2] No past medical history on file.  [3]   Past Surgical History:  Procedure Laterality Date     SECTION      two    DENTAL SURGERY     [4]   Family History  Problem Relation Name Age of Onset    Hypertension Mother      Diabetes Mother      Prostate cancer Father      Pancreatic cancer Paternal Grandmother      Substance Abuse Brother      Mental illness Neg Hx

## 2025-07-01 ENCOUNTER — TELEPHONE (OUTPATIENT)
Age: 70
End: 2025-07-01

## 2025-07-01 ENCOUNTER — OFFICE VISIT (OUTPATIENT)
Dept: FAMILY MEDICINE CLINIC | Facility: CLINIC | Age: 70
End: 2025-07-01
Payer: MEDICARE

## 2025-07-01 VITALS
OXYGEN SATURATION: 97 % | WEIGHT: 136.2 LBS | BODY MASS INDEX: 25.06 KG/M2 | TEMPERATURE: 98 F | DIASTOLIC BLOOD PRESSURE: 72 MMHG | SYSTOLIC BLOOD PRESSURE: 120 MMHG | HEART RATE: 82 BPM | HEIGHT: 62 IN | RESPIRATION RATE: 18 BRPM

## 2025-07-01 DIAGNOSIS — B02.9 HERPES ZOSTER WITHOUT COMPLICATION: Primary | ICD-10-CM

## 2025-07-01 PROCEDURE — G2211 COMPLEX E/M VISIT ADD ON: HCPCS | Performed by: FAMILY MEDICINE

## 2025-07-01 PROCEDURE — 99213 OFFICE O/P EST LOW 20 MIN: CPT | Performed by: FAMILY MEDICINE

## 2025-07-01 RX ORDER — FAMOTIDINE 20 MG/1
40 TABLET, FILM COATED ORAL 2 TIMES DAILY
Qty: 42 TABLET | Refills: 0
Start: 2025-07-01 | End: 2025-07-22

## 2025-07-01 RX ORDER — VALACYCLOVIR HYDROCHLORIDE 1 G/1
1000 TABLET, FILM COATED ORAL 3 TIMES DAILY
Qty: 21 TABLET | Refills: 0 | Status: SHIPPED | OUTPATIENT
Start: 2025-07-01 | End: 2025-07-08

## 2025-07-01 RX ORDER — PREDNISONE 10 MG/1
30 TABLET ORAL 2 TIMES DAILY WITH MEALS
Qty: 84 TABLET | Refills: 0 | Status: SHIPPED | OUTPATIENT
Start: 2025-07-01

## 2025-07-01 NOTE — PATIENT INSTRUCTIONS
You are on 3 different medicines so please get a box like calendar for the next 3 weeks and fill in each dose for each day so you know what you are doing.    Prednisone  3 tablets after eating twice a day  for 1 week  2 tablets after eating twice a day for 1 week  2 tablets after eating once a day for 1 week  Then 1 tablet a day to finish your last 3 pills    Valacyclovir  1000 mg 3 times a day for a week    Pepcid/famotidine  40 mg twice a day for 3 weeks    Any signs of infection or other problems please let me know

## 2025-07-01 NOTE — PROGRESS NOTES
"Name: Paula Schawb      : 1955      MRN: [id number]  Encounter Provider: Melody Membreno DO  Encounter Date: 2025   Encounter department: [facility]  :  Assessment & Plan  Herpes zoster without complication  Call with any problems  Orders:    predniSONE 10 mg tablet; Take 3 tablets (30 mg total) by mouth 2 (two) times a day with meals For 1 week, 2 pills twice a day for with meals for 1 week, then 2 pills daily for 1 week    valACYclovir (VALTREX) 1,000 mg tablet; Take 1 tablet (1,000 mg total) by mouth 3 (three) times a day for 7 days    famotidine (PEPCID) 20 mg tablet; Take 2 tablets (40 mg total) by mouth 2 (two) times a day for 21 days           History of Present Illness   Patient does a lot of outside work and thought maybe she was getting poison ivy from something  She went to [facility] and I gave her prednisone 10 mg a day for 5 days  Patient noticed about 7 days ago she started with a rash that started at the antecubital fossa and then went up and down her arm ending just on her thenar eminence and the tip of her thumb and her index finger and going to her back shoulder.  It is itchy but also it hurts      Review of Systems  Per HPI  Objective   /72   Pulse 82   Temp 98 °F (36.7 °C)   Resp 18   Ht 5' 2.25\" (1.581 m)   Wt 61.8 kg (136 lb 3.2 oz)   LMP  (LMP Unknown)   SpO2 97%   BMI 24.71 kg/m²      Physical Exam    General  Patient in no acute distress, well appearing, well nourished and appears stated age    Mental status  Good judgment and insight, oriented to time person and place, recent and remote memory is intact, mood and affect are normal, cooperative, and patient is reasonable.    Skin  Small vesicles that coalesce into larger vesicles on a pink base following the C6 dermatome    "

## 2025-07-01 NOTE — TELEPHONE ENCOUNTER
Patient call about asking about the medication she was prescribe today for   predniSONE 10 mg tablet  the pharmacy can not refill it until tomorrow but she still has some left from yesterday and has 3 and she liek to know if she she take those 3 for today and if she should also start the   valACYclovir (VALTREX) 1,000 mg tablet. Please reach out to the patient about the medication. Thank you

## 2025-07-02 NOTE — TELEPHONE ENCOUNTER
Spoke with patient advised she should start the Predisone today. She has not had the chance to get it, the pharmacy said it had something to do with her insurance. Patient has called 2x but had to leave a voicemail.     Advised that I would reach out to see the status of the medication.

## 2025-08-20 ENCOUNTER — HOSPITAL ENCOUNTER (OUTPATIENT)
Dept: MAMMOGRAPHY | Facility: CLINIC | Age: 70
Discharge: HOME | End: 2025-08-20
Attending: FAMILY MEDICINE
Payer: MEDICARE

## 2025-08-20 ENCOUNTER — HOSPITAL ENCOUNTER (OUTPATIENT)
Dept: ULTRASOUND IMAGING | Facility: CLINIC | Age: 70
Discharge: HOME | End: 2025-08-20
Attending: FAMILY MEDICINE
Payer: MEDICARE

## 2025-08-20 VITALS — WEIGHT: 136 LBS | HEIGHT: 62 IN | BODY MASS INDEX: 25.03 KG/M2

## 2025-08-20 DIAGNOSIS — R92.8 FOLLOW-UP EXAMINATION OF ABNORMAL MAMMOGRAM: ICD-10-CM

## 2025-08-20 PROCEDURE — 77066 DX MAMMO INCL CAD BI: CPT

## 2025-08-20 PROCEDURE — 76642 ULTRASOUND BREAST LIMITED: CPT

## 2025-08-20 PROCEDURE — G0279 TOMOSYNTHESIS, MAMMO: HCPCS
